# Patient Record
Sex: MALE | Race: WHITE | Employment: OTHER | URBAN - NONMETROPOLITAN AREA
[De-identification: names, ages, dates, MRNs, and addresses within clinical notes are randomized per-mention and may not be internally consistent; named-entity substitution may affect disease eponyms.]

---

## 2017-01-19 ENCOUNTER — TELEPHONE (OUTPATIENT)
Dept: UROLOGY | Age: 67
End: 2017-01-19

## 2017-01-26 ENCOUNTER — PROCEDURE VISIT (OUTPATIENT)
Dept: UROLOGY | Age: 67
End: 2017-01-26

## 2017-01-26 VITALS
DIASTOLIC BLOOD PRESSURE: 88 MMHG | HEIGHT: 69 IN | SYSTOLIC BLOOD PRESSURE: 126 MMHG | WEIGHT: 170 LBS | BODY MASS INDEX: 25.18 KG/M2

## 2017-01-26 DIAGNOSIS — R89.6 ABNORMAL CYTOLOGY: Primary | ICD-10-CM

## 2017-01-26 LAB
BILIRUBIN URINE: NEGATIVE
BLOOD URINE, POC: NORMAL
CHARACTER, URINE: CLEAR
COLOR, URINE: YELLOW
GLUCOSE URINE: NEGATIVE MG/DL
KETONES, URINE: NEGATIVE
LEUKOCYTE CLUMPS, URINE: NEGATIVE
NITRITE, URINE: NEGATIVE
PH, URINE: 5
PROTEIN, URINE: NEGATIVE MG/DL
SPECIFIC GRAVITY, URINE: >= 1.03 (ref 1–1.03)
UROBILINOGEN, URINE: 0.2 EU/DL

## 2017-01-26 PROCEDURE — 4004F PT TOBACCO SCREEN RCVD TLK: CPT | Performed by: UROLOGY

## 2017-01-26 PROCEDURE — 52000 CYSTOURETHROSCOPY: CPT | Performed by: UROLOGY

## 2017-01-26 PROCEDURE — 81003 URINALYSIS AUTO W/O SCOPE: CPT | Performed by: UROLOGY

## 2017-01-26 PROCEDURE — 99999 PR OFFICE/OUTPT VISIT,PROCEDURE ONLY: CPT | Performed by: UROLOGY

## 2017-01-26 ASSESSMENT — ENCOUNTER SYMPTOMS
BACK PAIN: 0
NAUSEA: 0
SHORTNESS OF BREATH: 0
ABDOMINAL PAIN: 0
FACIAL SWELLING: 0
EYE REDNESS: 0
CHEST TIGHTNESS: 0
COLOR CHANGE: 0
EYE PAIN: 0

## 2017-03-02 ENCOUNTER — TELEPHONE (OUTPATIENT)
Dept: UROLOGY | Age: 67
End: 2017-03-02

## 2017-03-02 DIAGNOSIS — C61 PROSTATE CANCER (HCC): Primary | ICD-10-CM

## 2017-03-09 LAB — PSA, ULTRASENSITIVE: 0.54 NG/ML

## 2017-06-08 ENCOUNTER — OFFICE VISIT (OUTPATIENT)
Dept: UROLOGY | Age: 67
End: 2017-06-08

## 2017-06-08 VITALS
HEIGHT: 69 IN | WEIGHT: 175 LBS | SYSTOLIC BLOOD PRESSURE: 118 MMHG | BODY MASS INDEX: 25.92 KG/M2 | DIASTOLIC BLOOD PRESSURE: 70 MMHG

## 2017-06-08 DIAGNOSIS — C61 CANCER OF PROSTATE (HCC): Primary | ICD-10-CM

## 2017-06-08 DIAGNOSIS — R82.89 URINE CYTOLOGY ABNORMAL: ICD-10-CM

## 2017-06-08 LAB
BILIRUBIN URINE: NEGATIVE
BLOOD URINE, POC: NEGATIVE
CHARACTER, URINE: CLEAR
COLOR, URINE: YELLOW
GLUCOSE URINE: NEGATIVE MG/DL
KETONES, URINE: NEGATIVE
LEUKOCYTE CLUMPS, URINE: NEGATIVE
NITRITE, URINE: NEGATIVE
PH, URINE: 5
PROTEIN, URINE: NEGATIVE MG/DL
SPECIFIC GRAVITY, URINE: 1.02 (ref 1–1.03)
UROBILINOGEN, URINE: 0.2 EU/DL

## 2017-06-08 PROCEDURE — 1123F ACP DISCUSS/DSCN MKR DOCD: CPT | Performed by: PHYSICIAN ASSISTANT

## 2017-06-08 PROCEDURE — 3017F COLORECTAL CA SCREEN DOC REV: CPT | Performed by: PHYSICIAN ASSISTANT

## 2017-06-08 PROCEDURE — G8420 CALC BMI NORM PARAMETERS: HCPCS | Performed by: PHYSICIAN ASSISTANT

## 2017-06-08 PROCEDURE — 4004F PT TOBACCO SCREEN RCVD TLK: CPT | Performed by: PHYSICIAN ASSISTANT

## 2017-06-08 PROCEDURE — 99213 OFFICE O/P EST LOW 20 MIN: CPT | Performed by: PHYSICIAN ASSISTANT

## 2017-06-08 PROCEDURE — 81003 URINALYSIS AUTO W/O SCOPE: CPT | Performed by: PHYSICIAN ASSISTANT

## 2017-06-08 PROCEDURE — G8427 DOCREV CUR MEDS BY ELIG CLIN: HCPCS | Performed by: PHYSICIAN ASSISTANT

## 2017-06-08 PROCEDURE — 4040F PNEUMOC VAC/ADMIN/RCVD: CPT | Performed by: PHYSICIAN ASSISTANT

## 2017-06-08 RX ORDER — HYDROCHLOROTHIAZIDE 25 MG/1
25 TABLET ORAL DAILY
COMMUNITY
End: 2018-01-25 | Stop reason: SINTOL

## 2017-06-09 LAB — CYTOLOGY-NON GYN: NORMAL

## 2017-06-12 LAB — PSA, ULTRASENSITIVE: 0.78 NG/ML

## 2017-06-14 ENCOUNTER — TELEPHONE (OUTPATIENT)
Dept: UROLOGY | Age: 67
End: 2017-06-14

## 2017-06-20 ENCOUNTER — NURSE ONLY (OUTPATIENT)
Dept: UROLOGY | Age: 67
End: 2017-06-20

## 2017-06-20 DIAGNOSIS — R31.9 HEMATURIA: Primary | ICD-10-CM

## 2017-06-20 DIAGNOSIS — C61 CANCER OF PROSTATE (HCC): ICD-10-CM

## 2017-06-20 PROCEDURE — 99999 PR OFFICE/OUTPT VISIT,PROCEDURE ONLY: CPT | Performed by: NURSE PRACTITIONER

## 2017-06-21 ENCOUNTER — OFFICE VISIT (OUTPATIENT)
Age: 67
End: 2017-06-21

## 2017-06-21 VITALS
OXYGEN SATURATION: 96 % | HEIGHT: 69 IN | TEMPERATURE: 98.4 F | WEIGHT: 169.2 LBS | SYSTOLIC BLOOD PRESSURE: 110 MMHG | BODY MASS INDEX: 25.06 KG/M2 | RESPIRATION RATE: 18 BRPM | DIASTOLIC BLOOD PRESSURE: 66 MMHG | HEART RATE: 106 BPM

## 2017-06-21 DIAGNOSIS — K40.90 RIGHT INGUINAL HERNIA: Primary | ICD-10-CM

## 2017-06-21 DIAGNOSIS — I10 ESSENTIAL HYPERTENSION: ICD-10-CM

## 2017-06-21 DIAGNOSIS — Z01.818 PRE-OP TESTING: ICD-10-CM

## 2017-06-21 PROCEDURE — 4004F PT TOBACCO SCREEN RCVD TLK: CPT | Performed by: SURGERY

## 2017-06-21 PROCEDURE — G8419 CALC BMI OUT NRM PARAM NOF/U: HCPCS | Performed by: SURGERY

## 2017-06-21 PROCEDURE — 4040F PNEUMOC VAC/ADMIN/RCVD: CPT | Performed by: SURGERY

## 2017-06-21 PROCEDURE — 99203 OFFICE O/P NEW LOW 30 MIN: CPT | Performed by: SURGERY

## 2017-06-21 PROCEDURE — G8427 DOCREV CUR MEDS BY ELIG CLIN: HCPCS | Performed by: SURGERY

## 2017-06-21 PROCEDURE — 3017F COLORECTAL CA SCREEN DOC REV: CPT | Performed by: SURGERY

## 2017-06-21 ASSESSMENT — ENCOUNTER SYMPTOMS
VOMITING: 0
EYE PAIN: 0
ABDOMINAL DISTENTION: 0
EYE REDNESS: 0
STRIDOR: 0
SHORTNESS OF BREATH: 0
PHOTOPHOBIA: 0
EYE DISCHARGE: 0
FACIAL SWELLING: 0
ABDOMINAL PAIN: 0
RECTAL PAIN: 0
CHEST TIGHTNESS: 0
WHEEZING: 0
BACK PAIN: 0
COLOR CHANGE: 0
RHINORRHEA: 0
CONSTIPATION: 1
TROUBLE SWALLOWING: 0
SINUS PRESSURE: 0
CHOKING: 0
APNEA: 0
COUGH: 0
BLOOD IN STOOL: 0
DIARRHEA: 0
ANAL BLEEDING: 0
SORE THROAT: 0
VOICE CHANGE: 0
NAUSEA: 0
EYE ITCHING: 0

## 2017-06-24 LAB
ANION GAP SERPL CALCULATED.3IONS-SCNC: 16 MMOL/L
BUN BLDV-MCNC: 24 MG/DL (ref 10–20)
CALCIUM SERPL-MCNC: 10.2 MG/DL (ref 8.7–10.8)
CHLORIDE BLD-SCNC: 104 MMOL/L (ref 95–111)
CO2: 25 MMOL/L (ref 21–32)
CREAT SERPL-MCNC: 1.2 MG/DL (ref 0.5–1.3)
EGFR AFRICAN AMERICAN: 73
EGFR IF NONAFRICAN AMERICAN: 60
GLUCOSE: 91 MG/DL (ref 70–100)
HCT VFR BLD CALC: 48.9 % (ref 41.4–51)
HEMOGLOBIN: 16.6 G/DL (ref 13.8–17)
POTASSIUM SERPL-SCNC: 3.8 MMOL/L (ref 3.5–5.4)
SODIUM BLD-SCNC: 141 MMOL/L (ref 134–147)

## 2017-07-21 ENCOUNTER — TELEPHONE (OUTPATIENT)
Dept: UROLOGY | Age: 67
End: 2017-07-21

## 2017-07-24 ENCOUNTER — ANESTHESIA EVENT (OUTPATIENT)
Dept: OPERATING ROOM | Age: 67
End: 2017-07-24
Payer: MEDICARE

## 2017-07-25 ENCOUNTER — HOSPITAL ENCOUNTER (OUTPATIENT)
Age: 67
Setting detail: OUTPATIENT SURGERY
Discharge: HOME | End: 2017-07-25
Attending: SURGERY | Admitting: SURGERY
Payer: MEDICARE

## 2017-07-25 ENCOUNTER — ANESTHESIA (OUTPATIENT)
Dept: OPERATING ROOM | Age: 67
End: 2017-07-25
Payer: MEDICARE

## 2017-07-25 VITALS
OXYGEN SATURATION: 93 % | DIASTOLIC BLOOD PRESSURE: 77 MMHG | TEMPERATURE: 97 F | HEIGHT: 68 IN | BODY MASS INDEX: 25.91 KG/M2 | HEART RATE: 95 BPM | SYSTOLIC BLOOD PRESSURE: 125 MMHG | RESPIRATION RATE: 16 BRPM | WEIGHT: 171 LBS

## 2017-07-25 VITALS
DIASTOLIC BLOOD PRESSURE: 97 MMHG | OXYGEN SATURATION: 95 % | RESPIRATION RATE: 1 BRPM | TEMPERATURE: 96.1 F | SYSTOLIC BLOOD PRESSURE: 163 MMHG

## 2017-07-25 LAB — POTASSIUM SERPL-SCNC: 4 MEQ/L (ref 3.5–5.2)

## 2017-07-25 PROCEDURE — 49650 LAP ING HERNIA REPAIR INIT: CPT | Performed by: SURGERY

## 2017-07-25 PROCEDURE — 3700000001 HC ADD 15 MINUTES (ANESTHESIA): Performed by: SURGERY

## 2017-07-25 PROCEDURE — 6370000000 HC RX 637 (ALT 250 FOR IP)

## 2017-07-25 PROCEDURE — 7100000001 HC PACU RECOVERY - ADDTL 15 MIN: Performed by: SURGERY

## 2017-07-25 PROCEDURE — 7100000010 HC PHASE II RECOVERY - FIRST 15 MIN: Performed by: SURGERY

## 2017-07-25 PROCEDURE — 3600000009 HC SURGERY ROBOT BASE: Performed by: SURGERY

## 2017-07-25 PROCEDURE — 2500000003 HC RX 250 WO HCPCS: Performed by: SURGERY

## 2017-07-25 PROCEDURE — 7100000000 HC PACU RECOVERY - FIRST 15 MIN: Performed by: SURGERY

## 2017-07-25 PROCEDURE — C1781 MESH (IMPLANTABLE): HCPCS | Performed by: SURGERY

## 2017-07-25 PROCEDURE — 84132 ASSAY OF SERUM POTASSIUM: CPT

## 2017-07-25 PROCEDURE — A6446 CONFORM BAND S W>=3" <5"/YD: HCPCS | Performed by: SURGERY

## 2017-07-25 PROCEDURE — 3600000019 HC SURGERY ROBOT ADDTL 15MIN: Performed by: SURGERY

## 2017-07-25 PROCEDURE — 3700000000 HC ANESTHESIA ATTENDED CARE: Performed by: SURGERY

## 2017-07-25 PROCEDURE — 36415 COLL VENOUS BLD VENIPUNCTURE: CPT

## 2017-07-25 PROCEDURE — 2580000003 HC RX 258: Performed by: ANESTHESIOLOGY

## 2017-07-25 PROCEDURE — 2500000003 HC RX 250 WO HCPCS: Performed by: ANESTHESIOLOGY

## 2017-07-25 PROCEDURE — 6360000002 HC RX W HCPCS: Performed by: ANESTHESIOLOGY

## 2017-07-25 PROCEDURE — 6360000002 HC RX W HCPCS: Performed by: SURGERY

## 2017-07-25 PROCEDURE — 2580000003 HC RX 258: Performed by: SURGERY

## 2017-07-25 PROCEDURE — 7100000011 HC PHASE II RECOVERY - ADDTL 15 MIN: Performed by: SURGERY

## 2017-07-25 PROCEDURE — S2900 ROBOTIC SURGICAL SYSTEM: HCPCS | Performed by: SURGERY

## 2017-07-25 DEVICE — MESH HERN L W10.8XL16CM R INGUINAL WHT POLYPR MFIL: Type: IMPLANTABLE DEVICE | Site: INGUINAL | Status: FUNCTIONAL

## 2017-07-25 DEVICE — MESH HERN L W10.8XL16CM L INGUINAL WHT POLYPR MFIL: Type: IMPLANTABLE DEVICE | Site: INGUINAL | Status: FUNCTIONAL

## 2017-07-25 RX ORDER — DEXAMETHASONE SODIUM PHOSPHATE 4 MG/ML
INJECTION, SOLUTION INTRA-ARTICULAR; INTRALESIONAL; INTRAMUSCULAR; INTRAVENOUS; SOFT TISSUE PRN
Status: DISCONTINUED | OUTPATIENT
Start: 2017-07-25 | End: 2017-07-25 | Stop reason: SDUPTHER

## 2017-07-25 RX ORDER — ONDANSETRON 2 MG/ML
INJECTION INTRAMUSCULAR; INTRAVENOUS PRN
Status: DISCONTINUED | OUTPATIENT
Start: 2017-07-25 | End: 2017-07-25 | Stop reason: SDUPTHER

## 2017-07-25 RX ORDER — DOCUSATE SODIUM 100 MG/1
100 CAPSULE, LIQUID FILLED ORAL 2 TIMES DAILY PRN
Qty: 30 CAPSULE | Refills: 1 | Status: SHIPPED | OUTPATIENT
Start: 2017-07-25 | End: 2018-01-25

## 2017-07-25 RX ORDER — GLYCOPYRROLATE 0.2 MG/ML
INJECTION INTRAMUSCULAR; INTRAVENOUS PRN
Status: DISCONTINUED | OUTPATIENT
Start: 2017-07-25 | End: 2017-07-25 | Stop reason: SDUPTHER

## 2017-07-25 RX ORDER — FENTANYL CITRATE 50 UG/ML
INJECTION, SOLUTION INTRAMUSCULAR; INTRAVENOUS PRN
Status: DISCONTINUED | OUTPATIENT
Start: 2017-07-25 | End: 2017-07-25 | Stop reason: SDUPTHER

## 2017-07-25 RX ORDER — SODIUM CHLORIDE, SODIUM LACTATE, POTASSIUM CHLORIDE, CALCIUM CHLORIDE 600; 310; 30; 20 MG/100ML; MG/100ML; MG/100ML; MG/100ML
INJECTION, SOLUTION INTRAVENOUS CONTINUOUS PRN
Status: DISCONTINUED | OUTPATIENT
Start: 2017-07-25 | End: 2017-07-25 | Stop reason: SDUPTHER

## 2017-07-25 RX ORDER — SODIUM CHLORIDE 9 MG/ML
INJECTION, SOLUTION INTRAVENOUS CONTINUOUS
Status: DISCONTINUED | OUTPATIENT
Start: 2017-07-25 | End: 2017-07-25 | Stop reason: HOSPADM

## 2017-07-25 RX ORDER — FENTANYL CITRATE 50 UG/ML
50 INJECTION, SOLUTION INTRAMUSCULAR; INTRAVENOUS EVERY 5 MIN PRN
Status: DISCONTINUED | OUTPATIENT
Start: 2017-07-25 | End: 2017-07-25 | Stop reason: HOSPADM

## 2017-07-25 RX ORDER — ACETAMINOPHEN 325 MG/1
650 TABLET ORAL EVERY 4 HOURS PRN
Status: DISCONTINUED | OUTPATIENT
Start: 2017-07-25 | End: 2017-07-25 | Stop reason: HOSPADM

## 2017-07-25 RX ORDER — DIPHENHYDRAMINE HYDROCHLORIDE 50 MG/ML
12.5 INJECTION INTRAMUSCULAR; INTRAVENOUS
Status: DISCONTINUED | OUTPATIENT
Start: 2017-07-25 | End: 2017-07-25 | Stop reason: HOSPADM

## 2017-07-25 RX ORDER — OXYCODONE HYDROCHLORIDE AND ACETAMINOPHEN 5; 325 MG/1; MG/1
1 TABLET ORAL EVERY 4 HOURS PRN
Status: DISCONTINUED | OUTPATIENT
Start: 2017-07-25 | End: 2017-07-25 | Stop reason: HOSPADM

## 2017-07-25 RX ORDER — ROCURONIUM BROMIDE 10 MG/ML
INJECTION, SOLUTION INTRAVENOUS PRN
Status: DISCONTINUED | OUTPATIENT
Start: 2017-07-25 | End: 2017-07-25 | Stop reason: SDUPTHER

## 2017-07-25 RX ORDER — LABETALOL HYDROCHLORIDE 5 MG/ML
5 INJECTION, SOLUTION INTRAVENOUS EVERY 10 MIN PRN
Status: DISCONTINUED | OUTPATIENT
Start: 2017-07-25 | End: 2017-07-25 | Stop reason: HOSPADM

## 2017-07-25 RX ORDER — SODIUM CHLORIDE 0.9 % (FLUSH) 0.9 %
10 SYRINGE (ML) INJECTION PRN
Status: DISCONTINUED | OUTPATIENT
Start: 2017-07-25 | End: 2017-07-25 | Stop reason: HOSPADM

## 2017-07-25 RX ORDER — SODIUM CHLORIDE 0.9 % (FLUSH) 0.9 %
10 SYRINGE (ML) INJECTION EVERY 12 HOURS SCHEDULED
Status: DISCONTINUED | OUTPATIENT
Start: 2017-07-25 | End: 2017-07-25 | Stop reason: HOSPADM

## 2017-07-25 RX ORDER — PROPOFOL 10 MG/ML
INJECTION, EMULSION INTRAVENOUS PRN
Status: DISCONTINUED | OUTPATIENT
Start: 2017-07-25 | End: 2017-07-25 | Stop reason: SDUPTHER

## 2017-07-25 RX ORDER — BUPIVACAINE HYDROCHLORIDE AND EPINEPHRINE 5; 5 MG/ML; UG/ML
INJECTION, SOLUTION EPIDURAL; INTRACAUDAL; PERINEURAL PRN
Status: DISCONTINUED | OUTPATIENT
Start: 2017-07-25 | End: 2017-07-25 | Stop reason: HOSPADM

## 2017-07-25 RX ORDER — OXYCODONE HYDROCHLORIDE AND ACETAMINOPHEN 5; 325 MG/1; MG/1
2 TABLET ORAL EVERY 4 HOURS PRN
Status: DISCONTINUED | OUTPATIENT
Start: 2017-07-25 | End: 2017-07-25 | Stop reason: HOSPADM

## 2017-07-25 RX ORDER — ONDANSETRON 2 MG/ML
4 INJECTION INTRAMUSCULAR; INTRAVENOUS EVERY 6 HOURS PRN
Status: DISCONTINUED | OUTPATIENT
Start: 2017-07-25 | End: 2017-07-25 | Stop reason: HOSPADM

## 2017-07-25 RX ORDER — PROMETHAZINE HYDROCHLORIDE 25 MG/ML
12.5 INJECTION, SOLUTION INTRAMUSCULAR; INTRAVENOUS
Status: DISCONTINUED | OUTPATIENT
Start: 2017-07-25 | End: 2017-07-25 | Stop reason: HOSPADM

## 2017-07-25 RX ORDER — OXYCODONE HYDROCHLORIDE AND ACETAMINOPHEN 5; 325 MG/1; MG/1
TABLET ORAL
Status: COMPLETED
Start: 2017-07-25 | End: 2017-07-25

## 2017-07-25 RX ORDER — FENTANYL CITRATE 50 UG/ML
25 INJECTION, SOLUTION INTRAMUSCULAR; INTRAVENOUS EVERY 5 MIN PRN
Status: DISCONTINUED | OUTPATIENT
Start: 2017-07-25 | End: 2017-07-25 | Stop reason: HOSPADM

## 2017-07-25 RX ORDER — MEPERIDINE HYDROCHLORIDE 25 MG/ML
25 INJECTION INTRAMUSCULAR; INTRAVENOUS; SUBCUTANEOUS
Status: DISCONTINUED | OUTPATIENT
Start: 2017-07-25 | End: 2017-07-25 | Stop reason: HOSPADM

## 2017-07-25 RX ORDER — OXYCODONE HYDROCHLORIDE AND ACETAMINOPHEN 5; 325 MG/1; MG/1
1-2 TABLET ORAL EVERY 6 HOURS PRN
Qty: 30 TABLET | Refills: 0 | Status: SHIPPED | OUTPATIENT
Start: 2017-07-25 | End: 2017-08-01

## 2017-07-25 RX ORDER — EPHEDRINE SULFATE 50 MG/ML
INJECTION INTRAVENOUS PRN
Status: DISCONTINUED | OUTPATIENT
Start: 2017-07-25 | End: 2017-07-25 | Stop reason: SDUPTHER

## 2017-07-25 RX ORDER — MIDAZOLAM HYDROCHLORIDE 1 MG/ML
INJECTION INTRAMUSCULAR; INTRAVENOUS PRN
Status: DISCONTINUED | OUTPATIENT
Start: 2017-07-25 | End: 2017-07-25 | Stop reason: SDUPTHER

## 2017-07-25 RX ORDER — NEOSTIGMINE METHYLSULFATE 1 MG/ML
INJECTION, SOLUTION INTRAVENOUS PRN
Status: DISCONTINUED | OUTPATIENT
Start: 2017-07-25 | End: 2017-07-25 | Stop reason: SDUPTHER

## 2017-07-25 RX ADMIN — ROCURONIUM BROMIDE 10 MG: 10 INJECTION INTRAVENOUS at 09:00

## 2017-07-25 RX ADMIN — CEFAZOLIN SODIUM 1 G: 1 INJECTION, SOLUTION INTRAVENOUS at 07:50

## 2017-07-25 RX ADMIN — DEXAMETHASONE SODIUM PHOSPHATE 8 MG: 4 INJECTION, SOLUTION INTRAMUSCULAR; INTRAVENOUS at 08:00

## 2017-07-25 RX ADMIN — FENTANYL CITRATE 50 MCG: 50 INJECTION, SOLUTION INTRAMUSCULAR; INTRAVENOUS at 08:41

## 2017-07-25 RX ADMIN — PHENYLEPHRINE HYDROCHLORIDE 100 MCG: 10 INJECTION INTRAVENOUS at 07:52

## 2017-07-25 RX ADMIN — EPHEDRINE SULFATE 10 MG: 50 INJECTION, SOLUTION INTRAVENOUS at 07:56

## 2017-07-25 RX ADMIN — FENTANYL CITRATE 50 MCG: 50 INJECTION, SOLUTION INTRAMUSCULAR; INTRAVENOUS at 08:04

## 2017-07-25 RX ADMIN — LIDOCAINE HYDROCHLORIDE 60 MG: 20 INJECTION, SOLUTION INTRAVENOUS at 07:42

## 2017-07-25 RX ADMIN — SODIUM CHLORIDE: 9 INJECTION, SOLUTION INTRAVENOUS at 06:50

## 2017-07-25 RX ADMIN — OXYCODONE HYDROCHLORIDE AND ACETAMINOPHEN 1 TABLET: 5; 325 TABLET ORAL at 11:28

## 2017-07-25 RX ADMIN — ROCURONIUM BROMIDE 40 MG: 10 INJECTION INTRAVENOUS at 07:44

## 2017-07-25 RX ADMIN — ONDANSETRON 4 MG: 2 INJECTION INTRAMUSCULAR; INTRAVENOUS at 08:00

## 2017-07-25 RX ADMIN — ROCURONIUM BROMIDE 10 MG: 10 INJECTION INTRAVENOUS at 08:21

## 2017-07-25 RX ADMIN — PHENYLEPHRINE HYDROCHLORIDE 100 MCG: 10 INJECTION INTRAVENOUS at 07:55

## 2017-07-25 RX ADMIN — FENTANYL CITRATE 50 MCG: 50 INJECTION, SOLUTION INTRAMUSCULAR; INTRAVENOUS at 07:58

## 2017-07-25 RX ADMIN — MIDAZOLAM 2 MG: 1 INJECTION INTRAMUSCULAR; INTRAVENOUS at 07:40

## 2017-07-25 RX ADMIN — GLYCOPYRROLATE 0.6 MG: 0.2 INJECTION, SOLUTION INTRAMUSCULAR; INTRAVENOUS at 09:33

## 2017-07-25 RX ADMIN — NEOSTIGMINE METHYLSULFATE 3 MG: 1 INJECTION INTRAVENOUS at 09:33

## 2017-07-25 RX ADMIN — FENTANYL CITRATE 25 MCG: 50 INJECTION INTRAMUSCULAR; INTRAVENOUS at 10:00

## 2017-07-25 RX ADMIN — FENTANYL CITRATE 50 MCG: 50 INJECTION, SOLUTION INTRAMUSCULAR; INTRAVENOUS at 07:41

## 2017-07-25 RX ADMIN — PROPOFOL 150 MG: 10 INJECTION, EMULSION INTRAVENOUS at 07:42

## 2017-07-25 RX ADMIN — SODIUM CHLORIDE, POTASSIUM CHLORIDE, SODIUM LACTATE AND CALCIUM CHLORIDE: 600; 310; 30; 20 INJECTION, SOLUTION INTRAVENOUS at 08:35

## 2017-07-25 RX ADMIN — FENTANYL CITRATE 50 MCG: 50 INJECTION, SOLUTION INTRAMUSCULAR; INTRAVENOUS at 09:45

## 2017-07-25 ASSESSMENT — PULMONARY FUNCTION TESTS
PIF_VALUE: 0
PIF_VALUE: 17
PIF_VALUE: 21
PIF_VALUE: 24
PIF_VALUE: 10
PIF_VALUE: 13
PIF_VALUE: 24
PIF_VALUE: 20
PIF_VALUE: 15
PIF_VALUE: 25
PIF_VALUE: 25
PIF_VALUE: 13
PIF_VALUE: 22
PIF_VALUE: 24
PIF_VALUE: 11
PIF_VALUE: 3
PIF_VALUE: 20
PIF_VALUE: 1
PIF_VALUE: 10
PIF_VALUE: 24
PIF_VALUE: 23
PIF_VALUE: 21
PIF_VALUE: 0
PIF_VALUE: 23
PIF_VALUE: 11
PIF_VALUE: 24
PIF_VALUE: 13
PIF_VALUE: 10
PIF_VALUE: 10
PIF_VALUE: 25
PIF_VALUE: 26
PIF_VALUE: 20
PIF_VALUE: 19
PIF_VALUE: 4
PIF_VALUE: 21
PIF_VALUE: 24
PIF_VALUE: 18
PIF_VALUE: 10
PIF_VALUE: 22
PIF_VALUE: 17
PIF_VALUE: 19
PIF_VALUE: 10
PIF_VALUE: 25
PIF_VALUE: 14
PIF_VALUE: 24
PIF_VALUE: 1
PIF_VALUE: 20
PIF_VALUE: 26
PIF_VALUE: 19
PIF_VALUE: 13
PIF_VALUE: 25
PIF_VALUE: 14
PIF_VALUE: 24
PIF_VALUE: 22
PIF_VALUE: 13
PIF_VALUE: 24
PIF_VALUE: 25
PIF_VALUE: 21
PIF_VALUE: 1
PIF_VALUE: 23
PIF_VALUE: 10
PIF_VALUE: 0
PIF_VALUE: 24
PIF_VALUE: 22
PIF_VALUE: 22
PIF_VALUE: 21
PIF_VALUE: 23
PIF_VALUE: 27
PIF_VALUE: 24
PIF_VALUE: 1
PIF_VALUE: 10
PIF_VALUE: 11
PIF_VALUE: 13
PIF_VALUE: 24
PIF_VALUE: 25
PIF_VALUE: 24
PIF_VALUE: 14
PIF_VALUE: 14
PIF_VALUE: 20
PIF_VALUE: 18
PIF_VALUE: 10
PIF_VALUE: 14
PIF_VALUE: 24
PIF_VALUE: 19
PIF_VALUE: 10
PIF_VALUE: 19
PIF_VALUE: 24
PIF_VALUE: 16
PIF_VALUE: 14
PIF_VALUE: 15
PIF_VALUE: 25
PIF_VALUE: 1
PIF_VALUE: 25
PIF_VALUE: 24
PIF_VALUE: 23
PIF_VALUE: 13
PIF_VALUE: 10
PIF_VALUE: 21
PIF_VALUE: 19
PIF_VALUE: 24
PIF_VALUE: 23
PIF_VALUE: 24
PIF_VALUE: 2
PIF_VALUE: 0
PIF_VALUE: 25
PIF_VALUE: 23
PIF_VALUE: 24
PIF_VALUE: 24
PIF_VALUE: 0
PIF_VALUE: 18
PIF_VALUE: 18
PIF_VALUE: 0
PIF_VALUE: 23
PIF_VALUE: 25
PIF_VALUE: 14
PIF_VALUE: 19
PIF_VALUE: 24
PIF_VALUE: 17
PIF_VALUE: 17
PIF_VALUE: 23
PIF_VALUE: 24
PIF_VALUE: 9
PIF_VALUE: 25
PIF_VALUE: 25
PIF_VALUE: 19
PIF_VALUE: 13
PIF_VALUE: 17
PIF_VALUE: 19
PIF_VALUE: 11
PIF_VALUE: 0
PIF_VALUE: 25

## 2017-07-25 ASSESSMENT — PAIN DESCRIPTION - LOCATION
LOCATION: ABDOMEN
LOCATION: ABDOMEN

## 2017-07-25 ASSESSMENT — PAIN SCALES - GENERAL
PAINLEVEL_OUTOF10: 3
PAINLEVEL_OUTOF10: 3
PAINLEVEL_OUTOF10: 1
PAINLEVEL_OUTOF10: 3
PAINLEVEL_OUTOF10: 2
PAINLEVEL_OUTOF10: 3
PAINLEVEL_OUTOF10: 4
PAINLEVEL_OUTOF10: 3

## 2017-07-25 ASSESSMENT — PAIN SCALES - WONG BAKER: WONGBAKER_NUMERICALRESPONSE: 0

## 2017-07-25 ASSESSMENT — PAIN - FUNCTIONAL ASSESSMENT: PAIN_FUNCTIONAL_ASSESSMENT: 0-10

## 2017-07-25 ASSESSMENT — PAIN DESCRIPTION - PAIN TYPE
TYPE: SURGICAL PAIN
TYPE: SURGICAL PAIN

## 2017-07-25 ASSESSMENT — PAIN DESCRIPTION - PROGRESSION: CLINICAL_PROGRESSION: NOT CHANGED

## 2017-07-25 ASSESSMENT — PAIN DESCRIPTION - DESCRIPTORS: DESCRIPTORS: ACHING

## 2017-07-25 ASSESSMENT — PAIN DESCRIPTION - ORIENTATION: ORIENTATION: RIGHT;LEFT

## 2017-07-26 ENCOUNTER — TELEPHONE (OUTPATIENT)
Dept: SURGERY | Age: 67
End: 2017-07-26

## 2017-08-09 ENCOUNTER — OFFICE VISIT (OUTPATIENT)
Dept: SURGERY | Age: 67
End: 2017-08-09

## 2017-08-09 VITALS
DIASTOLIC BLOOD PRESSURE: 78 MMHG | RESPIRATION RATE: 16 BRPM | WEIGHT: 167 LBS | TEMPERATURE: 96.9 F | BODY MASS INDEX: 25.39 KG/M2 | HEART RATE: 90 BPM | SYSTOLIC BLOOD PRESSURE: 108 MMHG

## 2017-08-09 DIAGNOSIS — Z87.19 S/P BILATERAL INGUINAL HERNIA REPAIR: Primary | ICD-10-CM

## 2017-08-09 DIAGNOSIS — Z98.890 S/P BILATERAL INGUINAL HERNIA REPAIR: Primary | ICD-10-CM

## 2017-08-09 PROCEDURE — 99024 POSTOP FOLLOW-UP VISIT: CPT | Performed by: NURSE PRACTITIONER

## 2017-08-09 ASSESSMENT — ENCOUNTER SYMPTOMS
PHOTOPHOBIA: 0
SINUS PRESSURE: 0
CHEST TIGHTNESS: 0
CONSTIPATION: 0
ABDOMINAL PAIN: 0
WHEEZING: 0
STRIDOR: 0
EYE REDNESS: 0
COUGH: 0
BLOOD IN STOOL: 0
ABDOMINAL DISTENTION: 0
EYE PAIN: 0
SHORTNESS OF BREATH: 0
APNEA: 0
ANAL BLEEDING: 0
COLOR CHANGE: 0
EYE ITCHING: 0
DIARRHEA: 0
VOICE CHANGE: 0
NAUSEA: 0
SORE THROAT: 0
EYE DISCHARGE: 0
FACIAL SWELLING: 0
CHOKING: 0
RECTAL PAIN: 0
RHINORRHEA: 0
TROUBLE SWALLOWING: 0
VOMITING: 0
BACK PAIN: 0

## 2017-08-22 ENCOUNTER — TELEPHONE (OUTPATIENT)
Dept: UROLOGY | Age: 67
End: 2017-08-22

## 2017-09-01 ENCOUNTER — HOSPITAL ENCOUNTER (OUTPATIENT)
Dept: RADIATION ONCOLOGY | Age: 67
Discharge: HOME | End: 2017-09-01
Payer: MEDICARE

## 2017-09-01 PROCEDURE — 99211 OFF/OP EST MAY X REQ PHY/QHP: CPT | Performed by: RADIOLOGY

## 2017-09-09 LAB — PROSTATE SPECIFIC ANTIGEN: 1.18 NG/ML

## 2017-09-12 ENCOUNTER — TELEPHONE (OUTPATIENT)
Dept: UROLOGY | Age: 67
End: 2017-09-12

## 2017-09-14 ENCOUNTER — OFFICE VISIT (OUTPATIENT)
Dept: UROLOGY | Age: 67
End: 2017-09-14
Payer: MEDICARE

## 2017-09-14 VITALS
WEIGHT: 171 LBS | DIASTOLIC BLOOD PRESSURE: 77 MMHG | HEIGHT: 69 IN | BODY MASS INDEX: 25.33 KG/M2 | SYSTOLIC BLOOD PRESSURE: 108 MMHG

## 2017-09-14 DIAGNOSIS — C61 CANCER OF PROSTATE (HCC): Primary | ICD-10-CM

## 2017-09-14 DIAGNOSIS — R31.29 MICROSCOPIC HEMATURIA: Primary | ICD-10-CM

## 2017-09-14 LAB
BILIRUBIN URINE: NORMAL
BLOOD URINE, POC: NORMAL
CHARACTER, URINE: CLEAR
COLOR, URINE: YELLOW
GLUCOSE URINE: NEGATIVE MG/DL
KETONES, URINE: NEGATIVE
LEUKOCYTE CLUMPS, URINE: NEGATIVE
NITRITE, URINE: NEGATIVE
PH, URINE: 5
PROTEIN, URINE: NORMAL MG/DL
SPECIFIC GRAVITY, URINE: >= 1.03 (ref 1–1.03)
UROBILINOGEN, URINE: 0.2 EU/DL

## 2017-09-14 PROCEDURE — 3017F COLORECTAL CA SCREEN DOC REV: CPT | Performed by: PHYSICIAN ASSISTANT

## 2017-09-14 PROCEDURE — 4004F PT TOBACCO SCREEN RCVD TLK: CPT | Performed by: PHYSICIAN ASSISTANT

## 2017-09-14 PROCEDURE — 1123F ACP DISCUSS/DSCN MKR DOCD: CPT | Performed by: PHYSICIAN ASSISTANT

## 2017-09-14 PROCEDURE — 4040F PNEUMOC VAC/ADMIN/RCVD: CPT | Performed by: PHYSICIAN ASSISTANT

## 2017-09-14 PROCEDURE — 99213 OFFICE O/P EST LOW 20 MIN: CPT | Performed by: PHYSICIAN ASSISTANT

## 2017-09-14 PROCEDURE — 81003 URINALYSIS AUTO W/O SCOPE: CPT | Performed by: PHYSICIAN ASSISTANT

## 2017-09-14 PROCEDURE — G8417 CALC BMI ABV UP PARAM F/U: HCPCS | Performed by: PHYSICIAN ASSISTANT

## 2017-09-14 PROCEDURE — G8428 CUR MEDS NOT DOCUMENT: HCPCS | Performed by: PHYSICIAN ASSISTANT

## 2017-09-22 ENCOUNTER — TELEPHONE (OUTPATIENT)
Dept: UROLOGY | Age: 67
End: 2017-09-22

## 2017-10-09 ENCOUNTER — TELEPHONE (OUTPATIENT)
Dept: UROLOGY | Age: 67
End: 2017-10-09

## 2017-10-10 NOTE — TELEPHONE ENCOUNTER
Spoke with Patient, He has not had his PSA drawn yet, States he has gout and has had some difficulty getting around. We R/S Appt for 2 weeks.

## 2017-10-19 LAB — PSA, ULTRASENSITIVE: 1.34 NG/ML

## 2017-10-24 ENCOUNTER — TELEPHONE (OUTPATIENT)
Dept: UROLOGY | Age: 67
End: 2017-10-24

## 2017-10-24 NOTE — TELEPHONE ENCOUNTER
Patient's PSA level continues to rise. Will see if insurance will cover Tae Rice prior to his next visit on Thursday, October 26.

## 2017-10-26 ENCOUNTER — OFFICE VISIT (OUTPATIENT)
Dept: UROLOGY | Age: 67
End: 2017-10-26
Payer: MEDICARE

## 2017-10-26 VITALS
HEIGHT: 69 IN | DIASTOLIC BLOOD PRESSURE: 88 MMHG | SYSTOLIC BLOOD PRESSURE: 130 MMHG | BODY MASS INDEX: 25.42 KG/M2 | WEIGHT: 171.6 LBS

## 2017-10-26 DIAGNOSIS — C61 CANCER OF PROSTATE (HCC): Primary | ICD-10-CM

## 2017-10-26 DIAGNOSIS — R31.29 MICROSCOPIC HEMATURIA: ICD-10-CM

## 2017-10-26 DIAGNOSIS — M81.8 OTHER OSTEOPOROSIS WITHOUT CURRENT PATHOLOGICAL FRACTURE: ICD-10-CM

## 2017-10-26 DIAGNOSIS — Z79.818 ANDROGEN DEPRIVATION THERAPY: ICD-10-CM

## 2017-10-26 LAB
BILIRUBIN URINE: NEGATIVE
BLOOD URINE, POC: NORMAL
CHARACTER, URINE: CLEAR
COLOR, URINE: YELLOW
GLUCOSE URINE: NEGATIVE MG/DL
KETONES, URINE: NEGATIVE
LEUKOCYTE CLUMPS, URINE: NEGATIVE
NITRITE, URINE: NEGATIVE
PH, URINE: 7.5
PROTEIN, URINE: NORMAL MG/DL
SPECIFIC GRAVITY, URINE: 1.02 (ref 1–1.03)
UROBILINOGEN, URINE: 0.2 EU/DL

## 2017-10-26 PROCEDURE — 99213 OFFICE O/P EST LOW 20 MIN: CPT | Performed by: PHYSICIAN ASSISTANT

## 2017-10-26 PROCEDURE — 4005F PHARM THX FOR OP RXD: CPT | Performed by: PHYSICIAN ASSISTANT

## 2017-10-26 PROCEDURE — G8484 FLU IMMUNIZE NO ADMIN: HCPCS | Performed by: PHYSICIAN ASSISTANT

## 2017-10-26 PROCEDURE — 4004F PT TOBACCO SCREEN RCVD TLK: CPT | Performed by: PHYSICIAN ASSISTANT

## 2017-10-26 PROCEDURE — 4040F PNEUMOC VAC/ADMIN/RCVD: CPT | Performed by: PHYSICIAN ASSISTANT

## 2017-10-26 PROCEDURE — G8427 DOCREV CUR MEDS BY ELIG CLIN: HCPCS | Performed by: PHYSICIAN ASSISTANT

## 2017-10-26 PROCEDURE — 1123F ACP DISCUSS/DSCN MKR DOCD: CPT | Performed by: PHYSICIAN ASSISTANT

## 2017-10-26 PROCEDURE — 81003 URINALYSIS AUTO W/O SCOPE: CPT | Performed by: PHYSICIAN ASSISTANT

## 2017-10-26 PROCEDURE — 3017F COLORECTAL CA SCREEN DOC REV: CPT | Performed by: PHYSICIAN ASSISTANT

## 2017-10-26 PROCEDURE — G8417 CALC BMI ABV UP PARAM F/U: HCPCS | Performed by: PHYSICIAN ASSISTANT

## 2017-10-26 NOTE — PROGRESS NOTES
slight DANNY. S1 and S2 normal.  Respiratory: Effort normal. CTA bilaterally without rales, rhonchi, wheezes. Abdomen: Soft, Non-tender. Active BS. Skin: No rashes or obvious lesions. Nursing note and vitals reviewed    Labs    Results for POC orders placed in visit on 10/26/17   POCT Urinalysis No Micro (Auto)   Result Value Ref Range    Glucose, Ur Negative NEGATIVE mg/dl    Bilirubin Urine Negative     Ketones, Urine Negative NEGATIVE    Specific Gravity, Urine 1.020 1.002 - 1.03    Blood, UA POC Moderate NEGATIVE    pH, Urine 7.50 5.0 - 9.0    Protein, Urine Trace NEGATIVE mg/dl    Urobilinogen, Urine 0.20 0.0 - 1.0 eu/dl    Nitrite, Urine Negative NEGATIVE    Leukocyte Clumps, Urine Negative NEGATIVE    Color, Urine Yellow YELLOW-STR    Character, Urine Clear [id number]       Lab Results   Component Value Date    CREATININE 1.2 06/23/2017    BUN 24 (H) 06/23/2017     06/23/2017    K 4.0 07/25/2017     06/23/2017    CO2 25 06/23/2017       Laboratory:     PSA 1.34 10/18/2017    PSA 1.18 09/09/2017     PSA 0.78 06/12/17     PSA 0.54 03/17     PSA 0.36 11/28/2016     PSA 0.08 05/06/2014         Plan:    1. Prostate cancer- Status post RALP with bilateral lymph node dissection in 6/12. His pathology was significant for [place] 4+5=9 disease with negative margins and negative nodes. Status post 6600cGy external beam radiation in August 2016. His first post radiation PSA decreased to 0.36 but has steadily risen over the last several weeks to his current value of 1.34. We discussed androgen deprivation therapy at length today. We reviewed the potential side effects of the medication. He decided that he would like to initiate [place] today. Will give Firmagon 240 mg SC injection x 1 today. At months two and three will proceed with Firmagon 80 mg SC injections. Will check PSA and testosterone monthly. After third injection will obtain a DEXA scan to see if Prolia needed.  Will then plan to switch to Lupron 45 mg IM every 6 months. Mr. Meliza Stack will return in January 2018 for status check.      2. Persistent Microscopic Hematuria/History of Atypical Cells on urine cytology- Persistent. Status post negative office cystoscopy in January 2017. CT scan of abdomen and pelvis in April 2017 revealed nonobstructive nephrolithiasis of the inferior pole calyces of the right kidney. No evidence of malignancy. Urine sent for Bladder Cx but results inconclusive. Will send second sample today. Will call patient with the results.     3. Inguinal Hernias- Resolved. Status post Robotic repair of right indirect and direct inguinal hernia and left direct inguinal hernia with mesh on July 25, 2017.

## 2017-10-26 NOTE — PROGRESS NOTES
Following Alexa Murray NP plan of care. FIRMAGON 120 MG GIVEN RIGHT ABDOMEN  MG GIVEN LEFT ABDOMEN. Lot Number: [id number]  Expiration Date: 10/2019  Valent[facility] Cheung #: [id number]     After Injection was given there were no reactions at injection site and patient was feeling well. Patient was notified that possible side effects from injections include: Redness, swelling and itching at the injection site. Possible side effects of androgen deprivation therapy, including hot flashes, flushing of the skin, increased weight, decreased sex drive, and difficulties with ED. Patient was instructed to call the office with any further questions or concerns. Patient supplied their own medications No    [address] therapy first initiated on 10/26/2017.

## 2017-11-02 ENCOUNTER — TELEPHONE (OUTPATIENT)
Dept: UROLOGY | Age: 67
End: 2017-11-02

## 2017-11-18 LAB
PSA, ULTRASENSITIVE: 0.13 NG/ML
TESTOSTERONE TOTAL: <20 NG/DL

## 2017-11-27 ENCOUNTER — NURSE ONLY (OUTPATIENT)
Dept: UROLOGY | Age: 67
End: 2017-11-27
Payer: MEDICARE

## 2017-11-27 DIAGNOSIS — C61 CANCER OF PROSTATE (HCC): Primary | ICD-10-CM

## 2017-11-27 PROCEDURE — 96402 CHEMO HORMON ANTINEOPL SQ/IM: CPT | Performed by: NURSE PRACTITIONER

## 2017-11-27 NOTE — PROGRESS NOTES
Following Emma Paz [facility] plan of care. FIRMAGON 80 MG GIVEN S.C Left ABDOMEN. Lot Number: [id number]  Expiration Date: 10/2019  Valentín Cheung #: [id number]    After Injection was given there were no reactions at injection site and patient was feeling well. Patient was notified that possible side effects from injections include: Redness, swelling and itching at the injection site. Possible side effects of androgen deprivation therapy, including hot flashes, flushing of the skin, increased weight, decreased sex drive, and difficulties with ED. Patient was instructed to call the office with any further questions or concerns. Patient supplied their own medications No    Pt [address] therapy first initiated on 10/26/17.

## 2017-12-11 LAB — PROSTATE SPECIFIC ANTIGEN: 0.07 NG/ML

## 2017-12-28 ENCOUNTER — NURSE ONLY (OUTPATIENT)
Dept: UROLOGY | Age: 67
End: 2017-12-28
Payer: MEDICARE

## 2017-12-28 DIAGNOSIS — C61 CANCER OF PROSTATE (HCC): Primary | ICD-10-CM

## 2017-12-28 PROCEDURE — 96402 CHEMO HORMON ANTINEOPL SQ/IM: CPT | Performed by: UROLOGY

## 2018-01-11 ENCOUNTER — TELEPHONE (OUTPATIENT)
Dept: UROLOGY | Age: 68
End: 2018-01-11

## 2018-01-11 LAB — PSA, ULTRASENSITIVE: 0.01 NG/ML

## 2018-01-11 NOTE — TELEPHONE ENCOUNTER
Called Esteban Gonzales and advised that his PSA has decreased 0.01, which is exactly where it should be on his firmagon injections.  Verified his bone scann appoinment and his F/u Appt with Surjit Lu

## 2018-01-17 ENCOUNTER — HOSPITAL ENCOUNTER (OUTPATIENT)
Dept: WOMENS IMAGING | Age: 68
Discharge: HOME | End: 2018-01-17
Payer: MEDICARE

## 2018-01-17 DIAGNOSIS — M81.8 OTHER OSTEOPOROSIS WITHOUT CURRENT PATHOLOGICAL FRACTURE: ICD-10-CM

## 2018-01-17 PROCEDURE — 77080 DXA BONE DENSITY AXIAL: CPT

## 2018-01-22 ENCOUNTER — TELEPHONE (OUTPATIENT)
Dept: UROLOGY | Age: 68
End: 2018-01-22

## 2018-01-25 ENCOUNTER — OFFICE VISIT (OUTPATIENT)
Dept: UROLOGY | Age: 68
End: 2018-01-25
Payer: MEDICARE

## 2018-01-25 VITALS
DIASTOLIC BLOOD PRESSURE: 70 MMHG | HEIGHT: 69 IN | WEIGHT: 167 LBS | SYSTOLIC BLOOD PRESSURE: 128 MMHG | BODY MASS INDEX: 24.73 KG/M2

## 2018-01-25 DIAGNOSIS — M81.8 IDIOPATHIC OSTEOPOROSIS: ICD-10-CM

## 2018-01-25 DIAGNOSIS — N52.9 ERECTILE DYSFUNCTION, UNSPECIFIED ERECTILE DYSFUNCTION TYPE: ICD-10-CM

## 2018-01-25 DIAGNOSIS — C61 CANCER OF PROSTATE (HCC): Primary | ICD-10-CM

## 2018-01-25 LAB
BILIRUBIN URINE: NORMAL
BLOOD URINE, POC: NEGATIVE
CHARACTER, URINE: CLEAR
COLOR, URINE: YELLOW
GLUCOSE URINE: NEGATIVE MG/DL
KETONES, URINE: NORMAL
LEUKOCYTE CLUMPS, URINE: NEGATIVE
NITRITE, URINE: NEGATIVE
PH, URINE: 5
PROTEIN, URINE: NORMAL MG/DL
SPECIFIC GRAVITY, URINE: >= 1.03 (ref 1–1.03)
UROBILINOGEN, URINE: 0.2 EU/DL

## 2018-01-25 PROCEDURE — 99999 PR OFFICE/OUTPT VISIT,PROCEDURE ONLY: CPT | Performed by: PHYSICIAN ASSISTANT

## 2018-01-25 PROCEDURE — 96402 CHEMO HORMON ANTINEOPL SQ/IM: CPT | Performed by: PHYSICIAN ASSISTANT

## 2018-01-25 PROCEDURE — 99213 OFFICE O/P EST LOW 20 MIN: CPT | Performed by: PHYSICIAN ASSISTANT

## 2018-01-25 PROCEDURE — 81003 URINALYSIS AUTO W/O SCOPE: CPT | Performed by: PHYSICIAN ASSISTANT

## 2018-01-25 PROCEDURE — 96401 CHEMO ANTI-NEOPL SQ/IM: CPT | Performed by: PHYSICIAN ASSISTANT

## 2018-01-25 RX ORDER — ACETAMINOPHEN 160 MG
TABLET,DISINTEGRATING ORAL DAILY
COMMUNITY
End: 2021-02-18

## 2018-01-25 RX ORDER — LISINOPRIL 5 MG/1
TABLET ORAL DAILY
COMMUNITY
End: 2019-08-15

## 2018-01-25 NOTE — PROGRESS NOTES
Following Mazariegos  plan of care. LUPRON 45 MG GIVEN I.M Right UOQ HIP  Lot Number: [id number]  Expiration Date: 6-3-2019  [facility] [id number]    After Injection was given there were no reactions at injection site and patient was feeling well. Patient was notified that possible side effects from injections include: Redness, swelling and itching at the injection site. Possible side effects of androgen deprivation therapy, including hot flashes, flushing of the skin, increased weight, decreased sex drive, and difficulties with ED. Patient was instructed to call the office with any further questions or concerns. Patient supplied their own medications No      Pt [address] therapy first initiated on Firmagon 10-26-17. Lupron 45mg 1-25-18  Prolia 60mg 1-25-18      Following Mazariegos  plan of care. PROLIA 60MG GIVEN Right S.C ABD  Lot Number: [id number]  Expiration Date: 4-2020  [facility] #: [id number]    After Injection was given there were no reactions at injection site and patient was feeling well. Patient was notified that possible side effects from injections include: Redness, swelling and itching at the injection site. Possible side effects of androgen deprivation therapy, including hot flashes, flushing of the skin, increased weight, decreased sex drive, and difficulties with ED. Patient was instructed to inform their dental office that they are receiving Prolia and that major dental procedures should be avoided. Patient was advised to call our office with any further questions or concerns. Patient supplied their own medications No      Pt [address] therapy first initiated on Prolia.  1-25-18

## 2018-01-28 NOTE — PROGRESS NOTES
I have reviewed the patients chart and Stanley Harris has discussed relevant portions with me. I agree with the assessment and plan.

## 2018-03-11 LAB — PSA, ULTRASENSITIVE: <0.014 NG/ML

## 2018-03-14 ENCOUNTER — TELEPHONE (OUTPATIENT)
Dept: UROLOGY | Age: 68
End: 2018-03-14

## 2018-05-09 LAB — PSA, ULTRASENSITIVE: <0.014 NG/ML

## 2018-07-24 LAB — PSA, ULTRASENSITIVE: <0.014 NG/ML

## 2018-07-25 NOTE — PROGRESS NOTES
Mr. David Gurrola a 72 year old male with a history of prostate cancer. He is status post RALP with bilateral lymph node dissection in 6/12. His pathology was significant for [place] 4+5=9 disease with negative margins and negative nodes. He underwent 6600 cGy external beam radiation therapy in August 2016. Unfortunately, his PSA level has started to rise over and he was started on Firmagon injections in October 2017. He has completed three Firmagon injections and underwent a DEXA scan (1/18) which revealed osteopenia. In regards to his urinary health, he reports mild urgency and frequency but states that it does not interfere with his daily activities. He denies weakened stream, retention, gross hematuria, or nocturia. He does report lower back pain but states that he has harbored back pain for several years. He is status post Robotic repair of right indirect and direct inguinal hernia and left direct inguinal hernia with mesh on July 25, 2017. In regards to his general medical health, he denies cough, dyspnea, chest pain, unexplained fevers, weight changes, fatigue, hematochezia, or dyschezia. He denies any significant hot flashes, night sweats, or fatigue. He presents today to discuss his most recent PSA level and continuation of therapy. Past Medical History:   Diagnosis Date    BBB (bundle branch block)     FH: CAD (coronary artery disease)     Gout     HLD (hyperlipidemia)     Hypertension     Malignant neoplasm of prostate ([facility] [address]     Obesity        Past Surgical History:   Procedure Laterality Date    CARDIOVASCULAR STRESS TEST  9 29 2010    Cannot exclude mild inferior stress induced ischemia. EF 58%.  Mildly abnormal nuclear scan    COLONOSCOPY  05/2017    COLONOSCOPY  06/02/2017    Dr Des Acuña Bilateral 7/25/2017    HERNIA INGUINAL REPAIR LAPAROSCOPIC ROBOTIC performed by Jet Osullivan MD at [address] [place] [address]  2012    TRANSTHORACIC ECHOCARDIOGRAM  9 29 2010    LV size and systolic function normal. EF 55-65%. Grade 1 diastolic dysfunction. Mild TR. Current Outpatient Prescriptions on File Prior to Visit   Medication Sig Dispense Refill    lisinopril (PRINIVIL;ZESTRIL) 5 MG tablet Take 5 mg by mouth daily      Cholecalciferol (VITAMIN D3) 2000 units CAPS Take by mouth daily      Acetaminophen (ARTHRITIS PAIN RELIEF PO) Take by mouth Indications: as needed for pain      aspirin 81 MG tablet Take 81 mg by mouth daily.  Multiple Vitamin (MULTI-VITAMINS PO) Take by mouth daily       Current Facility-Administered Medications on File Prior to Visit   Medication Dose Route Frequency Provider Last Rate Last Dose    degarelix (FIRMAGON) 120 mg subcutaneous  240 mg Subcutaneous Once Donna Wesley PA-C           No Known Allergies    Family History   Problem Relation Age of Onset    Heart Disease Mother     Heart Disease Father     Cancer Father        Social History     Social History    Marital status: Legally      Spouse name: N/A    Number of children: N/A    Years of education: N/A     Occupational History    Not on file. Social History Main Topics    Smoking status: Current Every Day Smoker     Packs/day: 1.00     Types: Cigarettes    Smokeless tobacco: Never Used      Comment: less than a pack a day    Alcohol use Yes      Comment: rarely    Drug use: No    Sexual activity: Not on file     Other Topics Concern    Not on file     Social History Narrative    No narrative on file       Review of Systems  General: Denies fever/chills, night sweats, fatigue or weight loss  HEENT: Denies headache, hearing loss, sore throat, difficulty swallowing  Pulmonary: Positive for chronic exertional dyspnea. Denies wheezing, hemoptysis. Cardiac: Denies recent chest pain, palpitations  GI: Denies nausea or vomiting, abdominal pain, bowel irregularity  : As above.   Neuro: Denies dizziness or light headedness,    Musculoskeletal: Reports mild,

## 2018-07-26 ENCOUNTER — OFFICE VISIT (OUTPATIENT)
Dept: UROLOGY | Age: 68
End: 2018-07-26
Payer: MEDICARE

## 2018-07-26 VITALS
BODY MASS INDEX: 26.07 KG/M2 | DIASTOLIC BLOOD PRESSURE: 60 MMHG | HEIGHT: 69 IN | WEIGHT: 176 LBS | SYSTOLIC BLOOD PRESSURE: 108 MMHG

## 2018-07-26 DIAGNOSIS — C61 PROSTATE CANCER (HCC): Primary | ICD-10-CM

## 2018-07-26 DIAGNOSIS — R82.89 ABNORMAL URINE CYTOLOGY: ICD-10-CM

## 2018-07-26 DIAGNOSIS — M81.8 IDIOPATHIC OSTEOPOROSIS: ICD-10-CM

## 2018-07-26 LAB
BILIRUBIN URINE: ABNORMAL
BLOOD URINE, POC: NEGATIVE
CHARACTER, URINE: CLEAR
COLOR, URINE: YELLOW
GLUCOSE URINE: NEGATIVE MG/DL
KETONES, URINE: ABNORMAL
LEUKOCYTE CLUMPS, URINE: NEGATIVE
NITRITE, URINE: NEGATIVE
PH, URINE: 5
PROTEIN, URINE: ABNORMAL MG/DL
SPECIFIC GRAVITY, URINE: >= 1.03 (ref 1–1.03)
UROBILINOGEN, URINE: 0.2 EU/DL

## 2018-07-26 PROCEDURE — 99213 OFFICE O/P EST LOW 20 MIN: CPT | Performed by: PHYSICIAN ASSISTANT

## 2018-07-26 PROCEDURE — 96401 CHEMO ANTI-NEOPL SQ/IM: CPT | Performed by: PHYSICIAN ASSISTANT

## 2018-07-26 PROCEDURE — 99999 PR OFFICE/OUTPT VISIT,PROCEDURE ONLY: CPT | Performed by: PHYSICIAN ASSISTANT

## 2018-07-26 PROCEDURE — 81003 URINALYSIS AUTO W/O SCOPE: CPT | Performed by: PHYSICIAN ASSISTANT

## 2018-07-26 PROCEDURE — 96402 CHEMO HORMON ANTINEOPL SQ/IM: CPT | Performed by: PHYSICIAN ASSISTANT

## 2018-07-26 NOTE — PROGRESS NOTES
Following Jud POWERS plan of care. LUPRON 45 MG GIVEN I.M left UOQ HIP  Lot Number: [id number]  Expiration Date: 11/13/2019  [facility] #: [id number]    After Injection was given there were no reactions at injection site and patient was feeling well. Patient was notified that possible side effects from injections include: Redness, swelling and itching at the injection site. Possible side effects of androgen deprivation therapy, including hot flashes, flushing of the skin, increased weight, decreased sex drive, and difficulties with ED. Patient was instructed to call the office with any further questions or concerns. Date of last Calcium/Vit D level: 06/23/17 new order today  Is patient on Calcium/Vit D replacement? yes  Date of last Dexa Scan:  Testosterone Level of <20.0 done on 11/17/17  Psa level of <0.014 done on 07/24/18    Patient supplied their own medications Yes      [address] therapy first initiated  firmagon on 10/26/17  Lupron and prolia initiated on 01/25/18. Following Jud POWERS plan of care. PROLIA 60MG GIVEN left S.C Arm  Lot Number: [id number]  Expiration Date: 08/2020  [facility] #: [id number]    After Injection was given there were no reactions at injection site and patient was feeling well. Patient was notified that possible side effects from injections include: Redness, swelling and itching at the injection site. Possible side effects of androgen deprivation therapy, including hot flashes, flushing of the skin, increased weight, decreased sex drive, and difficulties with ED. Patient was instructed to inform their dental office that they are receiving Prolia and that major dental procedures should be avoided. Patient was advised to call our office with any further questions or concerns. Any active dental problems, infections, or pain? No    Date of last dental appointment: ?     Any planned dental procedures? no    Patient supplied their own medications No    [address] therapy first

## 2018-07-27 LAB — CYTOLOGY-NON GYN: NORMAL

## 2018-08-27 ENCOUNTER — HOSPITAL ENCOUNTER (OUTPATIENT)
Age: 68
Discharge: HOME | End: 2018-08-27
Payer: MEDICARE

## 2018-08-27 ENCOUNTER — HOSPITAL ENCOUNTER (OUTPATIENT)
Dept: GENERAL RADIOLOGY | Age: 68
Discharge: HOME | End: 2018-08-27
Payer: MEDICARE

## 2018-08-27 DIAGNOSIS — M92.40: ICD-10-CM

## 2018-08-27 PROCEDURE — 73564 X-RAY EXAM KNEE 4 OR MORE: CPT

## 2018-10-12 ENCOUNTER — TELEPHONE (OUTPATIENT)
Dept: UROLOGY | Age: 68
End: 2018-10-12

## 2018-10-12 DIAGNOSIS — R82.89 ABNORMAL URINE CYTOLOGY: Primary | ICD-10-CM

## 2018-10-20 LAB — PSA, ULTRASENSITIVE: <0.014 NG/ML

## 2018-10-22 ENCOUNTER — TELEPHONE (OUTPATIENT)
Dept: UROLOGY | Age: 68
End: 2018-10-22

## 2018-10-25 ENCOUNTER — TELEPHONE (OUTPATIENT)
Dept: UROLOGY | Age: 68
End: 2018-10-25

## 2018-10-25 ENCOUNTER — NURSE ONLY (OUTPATIENT)
Dept: UROLOGY | Age: 68
End: 2018-10-25

## 2018-10-25 DIAGNOSIS — C61 CANCER OF PROSTATE (HCC): Primary | ICD-10-CM

## 2018-10-25 PROCEDURE — 99999 PR OFFICE/OUTPT VISIT,PROCEDURE ONLY: CPT | Performed by: PHYSICIAN ASSISTANT

## 2018-11-01 LAB — TESTOSTERONE FREE-MALE: <5 PG/ML (ref 47–244)

## 2018-11-16 ENCOUNTER — TELEPHONE (OUTPATIENT)
Dept: UROLOGY | Age: 68
End: 2018-11-16

## 2018-11-26 LAB
SEX HORMONE BINDING GLOBULIN: 70.7 NMOL/L (ref 19.3–76.4)
TESTOSTERONE FREE, CALC: <1.3 PG/ML (ref 47–244)
TESTOSTERONE FREE: <12 NG/DL (ref 300–720)

## 2018-12-18 LAB — PSA, ULTRASENSITIVE: <0.014 NG/ML

## 2019-01-28 LAB — PROSTATE SPECIFIC ANTIGEN: NORMAL NG/ML

## 2019-01-29 ENCOUNTER — TELEPHONE (OUTPATIENT)
Dept: UROLOGY | Age: 69
End: 2019-01-29

## 2019-02-14 ENCOUNTER — OFFICE VISIT (OUTPATIENT)
Dept: UROLOGY | Age: 69
End: 2019-02-14
Payer: MEDICARE

## 2019-02-14 ENCOUNTER — NURSE ONLY (OUTPATIENT)
Dept: UROLOGY | Age: 69
End: 2019-02-14
Payer: MEDICARE

## 2019-02-14 VITALS
DIASTOLIC BLOOD PRESSURE: 78 MMHG | SYSTOLIC BLOOD PRESSURE: 136 MMHG | HEIGHT: 69 IN | WEIGHT: 177 LBS | BODY MASS INDEX: 26.22 KG/M2

## 2019-02-14 DIAGNOSIS — C61 CANCER OF PROSTATE (HCC): Primary | ICD-10-CM

## 2019-02-14 DIAGNOSIS — C61 CANCER OF PROSTATE (HCC): ICD-10-CM

## 2019-02-14 DIAGNOSIS — M81.8 IDIOPATHIC OSTEOPOROSIS: Primary | ICD-10-CM

## 2019-02-14 LAB
BILIRUBIN, POC: NORMAL
BLOOD URINE, POC: NORMAL
CLARITY, POC: NORMAL
COLOR, POC: NORMAL
GLUCOSE URINE, POC: NORMAL
KETONES, POC: NORMAL
LEUKOCYTE EST, POC: NORMAL
NITRITE, POC: NORMAL
PH, POC: NORMAL
PROTEIN, POC: NORMAL
SPECIFIC GRAVITY, POC: NORMAL
UROBILINOGEN, POC: NORMAL

## 2019-02-14 PROCEDURE — 96401 CHEMO ANTI-NEOPL SQ/IM: CPT | Performed by: UROLOGY

## 2019-02-14 PROCEDURE — 99999 PR OFFICE/OUTPT VISIT,PROCEDURE ONLY: CPT | Performed by: UROLOGY

## 2019-02-14 PROCEDURE — 81003 URINALYSIS AUTO W/O SCOPE: CPT | Performed by: PHYSICIAN ASSISTANT

## 2019-02-14 PROCEDURE — 96402 CHEMO HORMON ANTINEOPL SQ/IM: CPT | Performed by: UROLOGY

## 2019-02-14 PROCEDURE — 99213 OFFICE O/P EST LOW 20 MIN: CPT | Performed by: PHYSICIAN ASSISTANT

## 2019-02-14 RX ORDER — FENOFIBRATE 145 MG/1
145 TABLET, COATED ORAL DAILY
COMMUNITY

## 2019-04-09 ENCOUNTER — HOSPITAL ENCOUNTER (OUTPATIENT)
Dept: INTERVENTIONAL RADIOLOGY/VASCULAR | Age: 69
Discharge: HOME | End: 2019-04-09

## 2019-04-09 DIAGNOSIS — Z13.6 ENCOUNTER FOR SCREENING FOR VASCULAR DISEASE: ICD-10-CM

## 2019-04-09 PROCEDURE — 9900000021 US VASCULAR SCREENING

## 2019-04-23 LAB
PSA, ULTRASENSITIVE: <0.01 NG/ML (ref 0–4)
TESTOSTERONE TOTAL: 0.11 NG/ML (ref 1.68–7.46)

## 2019-04-24 LAB
ANION GAP SERPL CALCULATED.3IONS-SCNC: 9 MMOL/L (ref 4–12)
BUN BLDV-MCNC: 25 MG/DL (ref 5–27)
CALCIUM SERPL-MCNC: 10.5 MG/DL (ref 8.5–10.5)
CHLORIDE BLD-SCNC: 109 MMOL/L (ref 98–109)
CO2: 23 MMOL/L (ref 22–32)
CREAT SERPL-MCNC: 1.31 MG/DL (ref 0.6–1.3)
EGFR AFRICAN AMERICAN: >60 ML/MIN/1.73SQ.M
EGFR IF NONAFRICAN AMERICAN: 54 ML/MIN/1.73SQ.M
GLUCOSE: 114 MG/DL (ref 65–99)
POTASSIUM SERPL-SCNC: 4.3 MMOL/L (ref 3.5–5)
SODIUM BLD-SCNC: 141 MMOL/L (ref 134–146)

## 2019-04-27 ENCOUNTER — TELEPHONE (OUTPATIENT)
Dept: UROLOGY | Age: 69
End: 2019-04-27

## 2019-04-27 NOTE — TELEPHONE ENCOUNTER
Patient's PSA and testosterone are negligible indicating treatment response. Please let him know that his blood glucose is elevated at 114. I have no recent labs to compare this to. Will send a copy to Dr. Jaimes Senior office for review.

## 2019-04-29 NOTE — TELEPHONE ENCOUNTER
The patient was advised of the message from [facility]. Patient's PSA and testosterone are negligible indicating treatment response. Blood glucose is elevated at 114. He voiced understanding. I routed the labs to Dr Shira Pedraza office. The patient stated he had a glucose level completed on 03/23/2019 and it was 97. Thank you.

## 2019-07-22 LAB
PSA, ULTRASENSITIVE: <0.01 NG/ML (ref 0–4)
TESTOSTERONE TOTAL: 0.14 NG/ML (ref 1.68–7.46)

## 2019-08-15 ENCOUNTER — OFFICE VISIT (OUTPATIENT)
Dept: UROLOGY | Age: 69
End: 2019-08-15
Payer: MEDICARE

## 2019-08-15 ENCOUNTER — TELEPHONE (OUTPATIENT)
Dept: UROLOGY | Age: 69
End: 2019-08-15

## 2019-08-15 ENCOUNTER — NURSE ONLY (OUTPATIENT)
Dept: UROLOGY | Age: 69
End: 2019-08-15
Payer: MEDICARE

## 2019-08-15 VITALS
DIASTOLIC BLOOD PRESSURE: 74 MMHG | HEIGHT: 69 IN | BODY MASS INDEX: 26.36 KG/M2 | WEIGHT: 178 LBS | SYSTOLIC BLOOD PRESSURE: 116 MMHG

## 2019-08-15 DIAGNOSIS — M81.8 IDIOPATHIC OSTEOPOROSIS: Primary | ICD-10-CM

## 2019-08-15 DIAGNOSIS — R39.198 DIFFICULTY URINATING: ICD-10-CM

## 2019-08-15 DIAGNOSIS — M81.0 OSTEOPOROSIS, UNSPECIFIED OSTEOPOROSIS TYPE, UNSPECIFIED PATHOLOGICAL FRACTURE PRESENCE: ICD-10-CM

## 2019-08-15 DIAGNOSIS — C61 PROSTATE CANCER (HCC): Primary | ICD-10-CM

## 2019-08-15 DIAGNOSIS — C61 CANCER OF PROSTATE (HCC): ICD-10-CM

## 2019-08-15 DIAGNOSIS — R31.29 MICROSCOPIC HEMATURIA: ICD-10-CM

## 2019-08-15 LAB
BILIRUBIN URINE: ABNORMAL
BLOOD URINE, POC: NEGATIVE
CHARACTER, URINE: CLEAR
COLOR, URINE: YELLOW
GLUCOSE URINE: NEGATIVE MG/DL
KETONES, URINE: ABNORMAL
LEUKOCYTE CLUMPS, URINE: NEGATIVE
NITRITE, URINE: NEGATIVE
PH, URINE: 5 (ref 5–9)
POST VOID RESIDUAL (PVR): 0 ML
PROTEIN, URINE: 30 MG/DL
SPECIFIC GRAVITY, URINE: >= 1.03 (ref 1–1.03)
UROBILINOGEN, URINE: 1 EU/DL (ref 0–1)

## 2019-08-15 PROCEDURE — 51798 US URINE CAPACITY MEASURE: CPT | Performed by: PHYSICIAN ASSISTANT

## 2019-08-15 PROCEDURE — 99213 OFFICE O/P EST LOW 20 MIN: CPT | Performed by: PHYSICIAN ASSISTANT

## 2019-08-15 PROCEDURE — 96402 CHEMO HORMON ANTINEOPL SQ/IM: CPT | Performed by: UROLOGY

## 2019-08-15 PROCEDURE — 81003 URINALYSIS AUTO W/O SCOPE: CPT | Performed by: PHYSICIAN ASSISTANT

## 2019-08-15 PROCEDURE — 96401 CHEMO ANTI-NEOPL SQ/IM: CPT | Performed by: UROLOGY

## 2019-08-15 RX ORDER — TOLTERODINE 4 MG/1
CAPSULE, EXTENDED RELEASE ORAL
COMMUNITY
Start: 2019-06-12 | End: 2020-02-20

## 2019-08-15 RX ORDER — LOSARTAN POTASSIUM 100 MG/1
TABLET ORAL
COMMUNITY
Start: 2019-06-12

## 2019-08-15 NOTE — PROGRESS NOTES
Scan:  Testosterone Level of 0.14 done on 7-22-19  Psa level of <0.01 done on 7-22-19  Patient supplied their own medications No      Pt [address] therapy first initiated on 10-26-17.

## 2019-08-15 NOTE — PROGRESS NOTES
Mr. Author Jeff is a 78-year-old male with a history of prostate cancer. He is status post RALP with bilateral lymph node dissection in 6/12. His pathology was significant for [place] 4+5=9 disease with negative margins and negative nodes. He underwent 6600 cGy external beam radiation therapy in August 2016. Unfortunately, his PSA level started to rise and he was started on Firmagon injections in October 2017. He completed three Firmagon injections and underwent a DEXA scan (1/18) which revealed osteopenia. He was started on Lupron and Prolia 6 month injections in January 2018. In regards to his urinary health, he reports continued incontinence with movement and coughing. Rarely, he leaks urine while sitting and is unaware of this leakage until he stands up and his pants are wet. He does experience urinary urgency through the day but denies nocturia. He was started on Detrol LA approximately four months ago and denies any improvement. He has been trying to perform pelvic floor exercises but he cannot remember many of these since he underwent therapy six years ago. He denies weakened stream, retention, gross hematuria, or nocturia. In regards to his general medical health, he denies cough, dyspnea, chest pain, unexplained fevers, weight changes, fatigue, hematochezia, or dyschezia. He denies any significant hot flashes, night sweats, or fatigue. He presents today to discuss his most recent PSA level and continuation of therapy. Past Medical History:   Diagnosis Date    BBB (bundle branch block)     FH: CAD (coronary artery disease)     Gout     HLD (hyperlipidemia)     Hypertension     Malignant neoplasm of prostate ([facility] [address]     Obesity        Past Surgical History:   Procedure Laterality Date    CARDIOVASCULAR STRESS TEST  9 29 2010    Cannot exclude mild inferior stress induced ischemia. EF 58%.  Mildly abnormal nuclear scan    COLONOSCOPY  05/2017    COLONOSCOPY  06/02/2017    Dr Jarrett Leggett Used    Tobacco comment: less than a pack a day   Substance and Sexual Activity    Alcohol use: Yes     Comment: rarely    Drug use: No    Sexual activity: Not on file   Lifestyle    Physical activity:     Days per week: Not on file     Minutes per session: Not on file    Stress: Not on file   Relationships    Social connections:     Talks on phone: Not on file     Gets together: Not on file     Attends Cheondo[employer] service: Not on file     Active member of club or organization: Not on file     Attends meetings of clubs or organizations: Not on file     Relationship status: Not on file    Intimate partner violence:     Fear of current or ex partner: Not on file     Emotionally abused: Not on file     Physically abused: Not on file     Forced sexual activity: Not on file   Other Topics Concern    Not on file   Social History Narrative    Not on file       Review of Systems  General: Denies fever/chills, night sweats, fatigue or weight loss  HEENT: Denies headache, hearing loss, sore throat, difficulty swallowing  Pulmonary: Positive for chronic exertional dyspnea. Denies wheezing, hemoptysis. Cardiac: Denies recent chest pain, palpitations  GI: Denies nausea or vomiting, abdominal pain, bowel irregularity  : As above. Neuro: Denies dizziness or light headedness,    Musculoskeletal: Reports mild, chronic lower back pain. Denies myalgias.     Exam    /74   Ht 5' 9\" (1.753 m)   Wt 178 lb (80.7 kg)   BMI 26.29 kg/m²      General: alert and oriented. Cooperative.    HENT: Normocephalic, Atraumatic. Eyes: No scleral icterus, PERRL, conjunctiva pink  Neck: Supple. No thyromegaly. Heart: RRR with slight DANNY. S1 and S2 normal.  Respiratory: Effort normal. CTA bilaterally without rales, rhonchi, wheezes. Abdomen: Soft, Non-tender. Active BS. Skin: No rashes or obvious lesions.   Nursing note and vitals reviewed      Labs    Results for POC orders placed in visit on 08/15/19   POCT Urinalysis No Micro Cells on urine cytology- Status post negative office cystoscopy in January 2017. CT scan of abdomen and pelvis in April 2017 revealed nonobstructive nephrolithiasis of the inferior pole calyces of the right kidney. No evidence of malignancy. Urine cytology in July 2018 was significant for atypical cells. Bladder Cx in October 2018 was normal. Continue to monitor. Urine cytology today.     3. Inguinal Hernias- Resolved. Status post Robotic repair of right indirect and direct inguinal hernia and left direct inguinal hernia with mesh on July 25, 2017.     4. Osteopenia- DEXA scan 1/18 revealed osteopenia. Started Prolia injections every six months. Repeat DEXA in 1/2020.     5. Mixed Incontinence- Symptomatic. On Detrol LA. Patient declined discontinuation of Detrol to try different ant-cholinergic. Patient declined office cystoscopy with Dr. Barry Marte to assess sphincter tone or rule out other pathology. Refused referral to [facility] for pelvic floor therapy. Continue to monitor.

## 2019-08-16 LAB — CYTOLOGY-NON GYN: NORMAL

## 2019-08-28 ENCOUNTER — TELEPHONE (OUTPATIENT)
Dept: UROLOGY | Age: 69
End: 2019-08-28

## 2019-08-28 NOTE — TELEPHONE ENCOUNTER
Patient declined to have a CT scan of the abdomen and pelvis at this time to assess stone burden but did state that he would call the office immediately with severe flank pain, irritative/obstructive symptomatology, and fever/chills. Patient does have crystals noted on his cytology. Declined [facility] at this time. Will discuss additional testing once again at his next appointment.

## 2019-10-22 LAB
ANION GAP SERPL CALCULATED.3IONS-SCNC: 13 MMOL/L (ref 4–12)
BUN BLDV-MCNC: 21 MG/DL (ref 5–27)
CALCIUM SERPL-MCNC: 9.4 MG/DL (ref 8.5–10.5)
CHLORIDE BLD-SCNC: 110 MMOL/L (ref 98–109)
CO2: 19 MMOL/L (ref 22–32)
CREAT SERPL-MCNC: 1.3 MG/DL (ref 0.6–1.3)
EGFR AFRICAN AMERICAN: >60 ML/MIN/1.73SQ.M
EGFR IF NONAFRICAN AMERICAN: 54 ML/MIN/1.73SQ.M
GLUCOSE: 106 MG/DL (ref 65–99)
POTASSIUM SERPL-SCNC: 4 MMOL/L (ref 3.5–5)
PSA, ULTRASENSITIVE: <0.01 NG/ML (ref 0–4)
SODIUM BLD-SCNC: 142 MMOL/L (ref 134–146)
TESTOSTERONE TOTAL: 0.17 NG/ML (ref 1.68–7.46)

## 2019-10-25 ENCOUNTER — TELEPHONE (OUTPATIENT)
Dept: UROLOGY | Age: 69
End: 2019-10-25

## 2020-01-20 ENCOUNTER — HOSPITAL ENCOUNTER (OUTPATIENT)
Dept: WOMENS IMAGING | Age: 70
Discharge: HOME | End: 2020-01-20
Payer: MEDICARE

## 2020-01-20 PROCEDURE — 77080 DXA BONE DENSITY AXIAL: CPT

## 2020-01-27 LAB
PROSTATE SPECIFIC ANTIGEN: NORMAL
TESTOSTERONE TOTAL: 0.17

## 2020-02-03 ENCOUNTER — TELEPHONE (OUTPATIENT)
Dept: UROLOGY | Age: 70
End: 2020-02-03

## 2020-02-03 NOTE — TELEPHONE ENCOUNTER
Per Elyssa Dobbins of [employer] Lupron does not need a prior auth. Ref #[id number]  Arturo does and is approved from 2/20/20-2/20/21 for a total of 2 txs.   Ref # [id number] for Prolia

## 2020-02-20 ENCOUNTER — NURSE ONLY (OUTPATIENT)
Dept: UROLOGY | Age: 70
End: 2020-02-20
Payer: MEDICARE

## 2020-02-20 ENCOUNTER — OFFICE VISIT (OUTPATIENT)
Dept: UROLOGY | Age: 70
End: 2020-02-20
Payer: MEDICARE

## 2020-02-20 VITALS
SYSTOLIC BLOOD PRESSURE: 138 MMHG | WEIGHT: 184 LBS | HEIGHT: 68 IN | DIASTOLIC BLOOD PRESSURE: 62 MMHG | BODY MASS INDEX: 27.89 KG/M2

## 2020-02-20 LAB
BILIRUBIN URINE: NEGATIVE
BLOOD URINE, POC: ABNORMAL
CHARACTER, URINE: CLEAR
COLOR, URINE: YELLOW
GLUCOSE URINE: NEGATIVE MG/DL
KETONES, URINE: NEGATIVE
LEUKOCYTE CLUMPS, URINE: NEGATIVE
NITRITE, URINE: NEGATIVE
PH, URINE: 5.5 (ref 5–9)
PROTEIN, URINE: NEGATIVE MG/DL
SPECIFIC GRAVITY, URINE: >= 1.03 (ref 1–1.03)
UROBILINOGEN, URINE: 0.2 EU/DL (ref 0–1)

## 2020-02-20 PROCEDURE — 96401 CHEMO ANTI-NEOPL SQ/IM: CPT | Performed by: UROLOGY

## 2020-02-20 PROCEDURE — 99213 OFFICE O/P EST LOW 20 MIN: CPT | Performed by: PHYSICIAN ASSISTANT

## 2020-02-20 PROCEDURE — 96402 CHEMO HORMON ANTINEOPL SQ/IM: CPT | Performed by: UROLOGY

## 2020-02-20 PROCEDURE — 81003 URINALYSIS AUTO W/O SCOPE: CPT | Performed by: PHYSICIAN ASSISTANT

## 2020-02-20 RX ORDER — CYANOCOBALAMIN (VITAMIN B-12) 2000 MCG
TABLET ORAL PRN
COMMUNITY
End: 2021-02-18

## 2020-02-20 NOTE — PROGRESS NOTES
Mr. Lynn Beltre is a 68-year-old male with a history of prostate cancer. He is status post RALP with bilateral lymph node dissection in 6/12. His pathology was significant for [place] 4+5=9 disease with negative margins and negative nodes. He underwent 6600 cGy external beam radiation therapy in August 2016. Unfortunately, his PSA level started to rise and he was started on Firmagon injections in October 2017. He completed three Firmagon injections and underwent a DEXA scan (1/18) which revealed osteopenia. He was started on Lupron and Prolia 6 month injections in January 2018. His most recent DEXA scan (1/20) revealed continued osteopenia with mild improvement compared to his 2018 study.     In regards to his urinary health, he reports mild incontinence with movement and coughing. He does experience urinary urgency through the day but denies nocturia. He was started on Detrol LA but it was ineffective. He started Myrbetriq 25 mg daily and this has been working more effectively. He denies weakened stream, retention, gross hematuria, or nocturia.      In regards to his general medical health, he denies cough, dyspnea, chest pain, unexplained fevers, weight changes, fatigue, hematochezia, or dyschezia. He denies any significant hot flashes, night sweats, or fatigue. He presents today to discuss his most recent PSA level and continuation of therapy. Past Medical History:   Diagnosis Date    BBB (bundle branch block)     FH: CAD (coronary artery disease)     Gout     HLD (hyperlipidemia)     Hypertension     Malignant neoplasm of prostate ([address]     Obesity        Past Surgical History:   Procedure Laterality Date    CARDIOVASCULAR STRESS TEST  9 29 2010    Cannot exclude mild inferior stress induced ischemia. EF 58%.  Mildly abnormal nuclear scan    COLONOSCOPY  05/2017    COLONOSCOPY  06/02/2017    Dr Libia Lawrence Bilateral 7/25/2017    HERNIA INGUINAL REPAIR LAPAROSCOPIC ROBOTIC performed by Arben Murrieta MD at [address]  2012    TRANSTHORACIC ECHOCARDIOGRAM  9 29 2010    LV size and systolic function normal. EF 55-65%. Grade 1 diastolic dysfunction. Mild TR. Current Outpatient Medications on File Prior to Visit   Medication Sig Dispense Refill    Cyanocobalamin (B-12) 2000 MCG TABS Take by mouth      Omega-3 Fatty Acids (OMEGA 3 500 PO) Take by mouth      mirabegron (MYRBETRIQ) 25 MG TB24 Take 25 mg by mouth daily      losartan (COZAAR) 100 MG tablet       CALCIUM PO Take by mouth daily      fenofibrate (TRICOR) 145 MG tablet Take 145 mg by mouth daily      aspirin 81 MG tablet Take 81 mg by mouth daily.  CINNAMON PO Take by mouth      leuprolide (LUPRON DEPOT, 6-MONTH,) 45 MG injection Inject 45 mg into the muscle once      denosumab (PROLIA) 60 MG/ML SOLN SC injection Inject 60 mg into the skin once      Cholecalciferol (VITAMIN D3) 2000 units CAPS Take by mouth daily      Multiple Vitamin (MULTI-VITAMINS PO) Take by mouth daily       Current Facility-Administered Medications on File Prior to Visit   Medication Dose Route Frequency Provider Last Rate Last Dose    degarelix (FIRMAGON) 120 mg subcutaneous  240 mg Subcutaneous Once Donna Wesley PA-C           No Known Allergies    Family History   Problem Relation Age of Onset    Heart Disease Mother     Heart Disease Father     Cancer Father        Social History     Socioeconomic History    Marital status:       Spouse name: Not on file    Number of children: Not on file    Years of education: Not on file    Highest education level: Not on file   Occupational History    Not on file   Social Needs    Financial resource strain: Not on file    Food insecurity:     Worry: Not on file     Inability: Not on file    Transportation needs:     Medical: Not on file     Non-medical: Not on file   Tobacco Use    Smoking status: Current Every Day Smoker     Packs/day: 0.75     Types: Cigarettes    Smokeless tobacco: Never Used    Tobacco comment: less than a pack a day   Substance and Sexual Activity    Alcohol use: Yes     Comment: rarely    Drug use: No    Sexual activity: Not on file   Lifestyle    Physical activity:     Days per week: Not on file     Minutes per session: Not on file    Stress: Not on file   Relationships    Social connections:     Talks on phone: Not on file     Gets together: Not on file     Attends Scientologist service: Not on file     Active member of club or organization: Not on file     Attends meetings of clubs or organizations: Not on file     Relationship status: Not on file    Intimate partner violence:     Fear of current or ex partner: Not on file     Emotionally abused: Not on file     Physically abused: Not on file     Forced sexual activity: Not on file   Other Topics Concern    Not on file   Social History Narrative    Not on file       Review of Systems  General: Denies fever/chills, night sweats, fatigue or weight loss  HEENT: Denies headache, hearing loss, sore throat, difficulty swallowing  Pulmonary: Positive for chronic exertional dyspnea. Denies wheezing, hemoptysis. Cardiac: Denies recent chest pain, palpitations  GI: Denies nausea or vomiting, abdominal pain, bowel irregularity  : As above. Neuro: Denies dizziness or light headedness,    Musculoskeletal: Reports mild, chronic lower back pain. Denies myalgias.     Exam    /62   Ht 5' 7.5\" (1.715 m)   Wt 184 lb (83.5 kg)   BMI 28.39 kg/m²     General: Alert and oriented. Cooperative.    HENT: Normocephalic, Atraumatic. Eyes: No scleral icterus, PERRL, conjunctiva pink  Neck: Supple. No thyromegaly. Heart: RRR with slight DANNY. S1 and S2 normal.  Respiratory: Effort normal. CTA bilaterally without rales, rhonchi, wheezes. Abdomen: Soft, Non-tender. Active BS. Skin: No rashes or obvious lesions.   Nursing note and vitals reviewed    Labs    Results for POC orders placed in visit the exam is    0.675  g/cm2. The T-score is -1.90 and the Z-score is -0. 60.     Since the previous similar exam of 01/17/2018, there has been a    +0.010 or +1.5% change in the BMD value  which represents no    significant interval change in bone density. This matches the    [address] [employer]'s criteria for osteopenia and places    the patient at a medium risk for fracture.         An additional bone density evaluation was performed 01/20/2020 on    the left femur neck using a Hologic unit. The BMD average for    the exam is 0.677  g/cm2. The T-score is -1.90 and the Z-score is    -0.60.  Since the previous similar exam of 01/17/2018, there has    been a +0.022 or +3.4% change in the BMD value  which represents    no significant interval change in bone density. This matches the   [address] [employer]'s criteria for osteopenia and places    the patient at a medium risk for fracture.         An additional bone density evaluation was performed 01/20/2020 on    the AP L1-L4 region of spine using a Hologic unit. The BMD    average for the exam is 1.074  g/cm2. The T-score is -0.20 and    the Z-score is 0.80.  Since the previous similar exam of    01/17/2018, there has been a +0.073 or +7.3% change in the BMD    value  which represents no significant interval change in bone    density. This matches the [address] [employer]'s criteria    for normal bone density and places the patient within normal    limits of fracture risk.         BMD change vs previous is 2.4% for the hips.  BMD change vs    previous is 7.2% for the spine. This is based on dissimilar scan    types or analysis methods.        This patient does not qualify for a FRAX assessment because the    patient has been treated for osteoporosis.           IMPRESSION: OSTEOPENIA   Patient is at medium risk for fracture.  Recheck of BMD in 1-2    years and patient consult w/primary care provider are          Plan:    1.  Prostate cancer- Status post RALP with bilateral lymph node dissection in 6/12. His pathology was significant for [place] 4+5=9 disease with negative margins and negative nodes. Status post 6600cGy external beam radiation in August 2016. He started [place] in October 2017 and has received three injections. His PSA has responded nicely to ADT. Switched to Lupron 45 mg IM injections (January 2018) every six months for convenience. DEXA scan (1/18) revealed osteopenia. Started Prolia every six months. Repeat DEXA scan in January 2020. Lupron 45 mg IM injection and Prolia today. Mr. Fern Alvarado will return in six months for status check. Call with any questions or concerns. PSA levels are monitored every three months and have been stable at <0.01. Will obtain PSA, testosterone, calcium every six months.      2. Persistent Microscopic Hematuria/History of Atypical Cells on urine cytology- Status post negative office cystoscopy in January 2017. CT scan of abdomen and pelvis in April 2017 revealed nonobstructive nephrolithiasis of the inferior pole calyces of the right kidney. No evidence of malignancy. Urine cytology in July 2018 was significant for atypical cells. Bladder Cx in October 2018 was normal. Continue to monitor. Urine cytology today.     3. Inguinal Hernias- Resolved. Status post Robotic repair of right indirect and direct inguinal hernia and left direct inguinal hernia with mesh on July 25, 2017.     4. Osteopenia- DEXA scan 1/18 revealed osteopenia. Started Prolia injections every six months. Repeat DEXA in 1/2020 reveals continued osteopenia but mild improvement. Continue Prolia, Vitamin D, and 1200 mg calcium daily. Discussed sources of dietary calcium versus calcium supplementation. Smoking cessation would be helpful     5. Mixed Incontinence- Myrbetriq 25 mg daily offering better control. Refused referral to [facility] for pelvic floor therapy. Continue to monitor.

## 2020-02-21 LAB — CYTOLOGY-NON GYN: NORMAL

## 2020-07-27 LAB
CALCIUM SERPL-MCNC: 10.3 MG/DL (ref 8.5–10.5)
PSA, ULTRASENSITIVE: <0.01 NG/ML (ref 0–4)
TESTOSTERONE TOTAL: 0.26 NG/ML (ref 1.68–7.46)

## 2020-08-17 NOTE — PROGRESS NOTES
Mr. Samaria Lovett is a 55-year-old male with a history of prostate cancer. He is status post RALP with bilateral lymph node dissection in 6/12. His pathology was significant for [place] 4+5=9 disease with negative margins and negative nodes. He underwent 6600 cGy external beam radiation therapy in August 2016. Unfortunately, his PSA level started to rise and he was started on Firmagon injections in October 2017. He completed three Firmagon injections and underwent a DEXA scan (1/18) which revealed osteopenia. He was started on Lupron and Prolia 6 month injections in January 2018. His most recent DEXA scan (1/20) revealed continued osteopenia with mild improvement compared to his 2018 study.     In regards to his urinary health, he reports mild incontinence with movement and coughing. He does experience urinary urgency through the day but denies nocturia. He was started on Detrol LA but it was ineffective. He started Myrbetriq 25 mg daily and this has been working more effectively but he still would like further improvment. He denies weakened stream, retention, gross hematuria, or nocturia.      In regards to his general medical health, he denies cough, dyspnea, chest pain, unexplained fevers, weight changes, fatigue, hematochezia, or dyschezia. He denies any significant hot flashes, night sweats, or fatigue. He presents today to discuss his most recent PSA level and continuation of therapy. Past Medical History:   Diagnosis Date    BBB (bundle branch block)     FH: CAD (coronary artery disease)     Gout     HLD (hyperlipidemia)     Hypertension     [facility] [address]     Obesity        Past Surgical History:   Procedure Laterality Date    CARDIOVASCULAR STRESS TEST  9 29 2010    Cannot exclude mild inferior stress induced ischemia. EF 58%.  Mildly abnormal nuclear scan    COLONOSCOPY  05/2017    COLONOSCOPY  06/02/2017    Dr Dorita Whitaker Bilateral 7/25/2017    HERNIA INGUINAL REPAIR LAPAROSCOPIC ROBOTIC performed by Leida Vidal MD at [address]  2012    TRANSTHORACIC ECHOCARDIOGRAM  9 29 2010    LV size and systolic function normal. EF 55-65%. Grade 1 diastolic dysfunction. Mild TR. Current Outpatient Medications on File Prior to Visit   Medication Sig Dispense Refill    Cyanocobalamin (B-12) 2000 MCG TABS Take by mouth as needed       Omega-3 Fatty Acids (OMEGA 3 500 PO) Take by mouth      mirabegron (MYRBETRIQ) 25 MG TB24 Take 25 mg by mouth daily      losartan (COZAAR) 100 MG tablet       CINNAMON PO Take by mouth as needed       fenofibrate (TRICOR) 145 MG tablet Take 145 mg by mouth daily      leuprolide (LUPRON DEPOT, 6-MONTH,) 45 MG injection Inject 45 mg into the muscle once      denosumab (PROLIA) 60 MG/ML SOLN SC injection Inject 60 mg into the skin once      aspirin 81 MG tablet Take 81 mg by mouth daily.  CALCIUM PO Take by mouth daily      Cholecalciferol (VITAMIN D3) 2000 units CAPS Take by mouth daily      Multiple Vitamin (MULTI-VITAMINS PO) Take by mouth daily       Current Facility-Administered Medications on File Prior to Visit   Medication Dose Route Frequency Provider Last Rate Last Dose    degarelix (FIRMAGON) 120 mg subcutaneous  240 mg Subcutaneous Once Donna Wesley PA-C           No Known Allergies    Family History   Problem Relation Age of Onset    Heart Disease Mother     Heart Disease Father     Cancer Father        Social History     Socioeconomic History    Marital status:       Spouse name: Not on file    Number of children: Not on file    Years of education: Not on file    Highest education level: Not on file   Occupational History    Not on file   Social Needs    Financial resource strain: Not on file    Food insecurity     Worry: Not on file     Inability: Not on file    Transportation needs     Medical: Not on file     Non-medical: Not on file   Tobacco Use    Smoking status: Current Every Day Smoker     Packs/day: 1.00     Years: 50.00     Pack years: 50.00     Types: Cigarettes    Smokeless tobacco: Never Used   Substance and Sexual Activity    Alcohol use: Yes     Comment: rarely    Drug use: No    Sexual activity: Not on file   Lifestyle    Physical activity     Days per week: Not on file     Minutes per session: Not on file    Stress: Not on file   Relationships    Social connections     Talks on phone: Not on file     Gets together: Not on file     Attends Adventism service: Not on file     Active member of club or organization: Not on file     Attends meetings of clubs or organizations: Not on file     Relationship status: Not on file    Intimate partner violence     Fear of current or ex partner: Not on file     Emotionally abused: Not on file     Physically abused: Not on file     Forced sexual activity: Not on file   Other Topics Concern    Not on file   Social History Narrative    Not on file     Review of Systems:     General: Denies fever/chills, night sweats, fatigue or weight loss  HEENT: Denies headache, hearing loss, sore throat, difficulty swallowing  Pulmonary: Positive for chronic exertional dyspnea. Denies wheezing, hemoptysis. Cardiac: Denies recent chest pain, palpitations  GI: Denies nausea or vomiting, abdominal pain, bowel irregularity  : As above. Neuro: Denies dizziness or light headedness,    Musculoskeletal: Reports mild, chronic lower back pain. Denies myalgias.     Exam     General: Alert and oriented. Cooperative.    HENT: Normocephalic, Atraumatic. Eyes: No scleral icterus, PERRL, conjunctiva pink  Neck: Supple. No thyromegaly. Heart: RRR with slight DANNY. S1 and S2 normal.  Respiratory: Effort normal. CTA bilaterally without rales, rhonchi, wheezes. Abdomen: Soft, Non-tender. Active BS. Skin: No rashes or obvious lesions.   Nursing note and vitals reviewed    Labs    Results for POC orders placed in visit on 08/20/20   POCT Urinalysis No Micro (Auto)   Result Value Ref Range    Glucose, Ur Negative NEGATIVE mg/dl    Bilirubin Urine Negative     Ketones, Urine Negative NEGATIVE    Specific Gravity, Urine 1.025 1.002 - 1.03    Blood, UA POC Negative NEGATIVE    pH, Urine 5.50 5.0 - 9.0    Protein, Urine Negative NEGATIVE mg/dl    Urobilinogen, Urine 0.20 0.0 - 1.0 eu/dl    Nitrite, Urine Negative NEGATIVE    Leukocyte Clumps, Urine Negative NEGATIVE    Color, Urine Yellow YELLOW-STR    Character, Urine Clear [id number]       Lab Results   Component Value Date    CREATININE 1.30 10/22/2019    BUN 21 10/22/2019     10/22/2019    K 4.0 10/22/2019     (H) 10/22/2019    CO2 19 (L) 10/22/2019       Lab Results   Component Value Date    PSA  01/27/2020      Comment:      <0.01    PSA  01/28/2019      Comment:      <0.014    PSA 0.07 12/11/2017     PSA Testosterone Date   <0.01 0.26 07/27/20   <0.01 0.17 01/2020    <0.01  0.14  07/22/19   <0.014 10 01/2019   <0.014   10/2018   < 0.014   07/2018   <0.014   05/2018   <0.014   03/2018   0.01   01/11/2018   0.07   12/11/2017   1.34   10/18/17   1.18   09/09/2017   0.78   06/12/17   0.54   03/17   0.36   11/28/2016   0.08   05/06/2014      Calcium: 10.3    Plan:  1. Prostate cancer- Status post RALP with bilateral lymph node dissection in 6/12. His pathology was significant for [place] 4+5=9 disease with negative margins and negative nodes. Status post 6600cGy external beam radiation in August 2016. He started [place] in October 2017 and has received three injections. His PSA has responded nicely to ADT. Switched to Lupron 45 mg IM injections (January 2018) every six months for convenience. DEXA scan (1/18) revealed osteopenia. Started Prolia every six months. DEXA scan was repeated in January 2020. Lupron 45 mg IM injection and Prolia today. Mr. Keon Casey will return in six months for status check. Call with any questions or concerns.  PSA levels are monitored every three to six months and have been stable at <0.01. Will obtain PSA, testosterone, calcium every six months.      2. Persistent Microscopic Hematuria/History of Atypical Cells on urine cytology- Status post negative office cystoscopy in January 2017. CT scan of abdomen and pelvis in April 2017 revealed nonobstructive nephrolithiasis of the inferior pole calyces of the right kidney. No evidence of malignancy. Urine cytology in July 2018 was significant for atypical cells. Bladder Cx in October 2018 was normal. Continue to monitor. Urine cytology today.     3. Inguinal Hernias- Resolved. Status post Robotic repair of right indirect and direct inguinal hernia and left direct inguinal hernia with mesh on July 25, 2017.     4. Osteopenia- DEXA scan 1/18 revealed osteopenia. Started Prolia injections every six months. Repeat DEXA in 1/2020 reveals continued osteopenia but mild improvement. Continue Prolia, Vitamin D, and 1200 mg calcium daily. Discussed sources of dietary calcium versus calcium supplementation. Smoking cessation would be helpful     5. Mixed Incontinence- Myrbetriq 25 mg daily offering better control but he would like to optimize his therapy if possible. Will increase Myrbetriq to 50 mg daily. Gave four weeks of samples. Refused referral to [facility] for pelvic floor therapy. Continue to monitor.

## 2020-08-20 ENCOUNTER — NURSE ONLY (OUTPATIENT)
Dept: UROLOGY | Age: 70
End: 2020-08-20
Payer: MEDICARE

## 2020-08-20 ENCOUNTER — OFFICE VISIT (OUTPATIENT)
Dept: UROLOGY | Age: 70
End: 2020-08-20
Payer: MEDICARE

## 2020-08-20 VITALS — BODY MASS INDEX: 27.68 KG/M2 | WEIGHT: 182.6 LBS | TEMPERATURE: 97.5 F | HEIGHT: 68 IN

## 2020-08-20 LAB
BILIRUBIN URINE: NEGATIVE
BLOOD URINE, POC: NEGATIVE
CHARACTER, URINE: CLEAR
COLOR, URINE: YELLOW
GLUCOSE URINE: NEGATIVE MG/DL
KETONES, URINE: NEGATIVE
LEUKOCYTE CLUMPS, URINE: NEGATIVE
NITRITE, URINE: NEGATIVE
PH, URINE: 5.5 (ref 5–9)
PROTEIN, URINE: NEGATIVE MG/DL
SPECIFIC GRAVITY, URINE: 1.02 (ref 1–1.03)
UROBILINOGEN, URINE: 0.2 EU/DL (ref 0–1)

## 2020-08-20 PROCEDURE — 96401 CHEMO ANTI-NEOPL SQ/IM: CPT | Performed by: UROLOGY

## 2020-08-20 PROCEDURE — 81003 URINALYSIS AUTO W/O SCOPE: CPT | Performed by: PHYSICIAN ASSISTANT

## 2020-08-20 PROCEDURE — 96402 CHEMO HORMON ANTINEOPL SQ/IM: CPT | Performed by: UROLOGY

## 2020-08-20 PROCEDURE — 99213 OFFICE O/P EST LOW 20 MIN: CPT | Performed by: PHYSICIAN ASSISTANT

## 2020-08-20 NOTE — PATIENT INSTRUCTIONS
You may receive a survey regarding the care you received during your visit. Your input is valuable to us. We encourage you to complete and return your survey. We hope you will choose us in the future for your healthcare needs. Patient Education        Stopping Smoking: Care Instructions  Your Care Instructions     Cigarette smokers crave the nicotine in cigarettes. Giving it up is much harder than simply changing a habit. Your body has to stop craving the nicotine. It is hard to quit, but you can do it. There are many tools that people use to quit smoking. You may find that combining tools works best for you. There are several steps to quitting. First you get ready to quit. Then you get support to help you. After that, you learn new skills and behaviors to become a nonsmoker. For many people, a necessary step is getting and using medicine. Your doctor will help you set up the plan that best meets your needs. You may want to attend a smoking cessation program to help you quit smoking. When you choose a program, look for one that has proven success. Ask your doctor for ideas. You will greatly increase your chances of success if you take medicine as well as get counseling or join a cessation program.  Some of the changes you feel when you first quit tobacco are uncomfortable. Your body will miss the nicotine at first, and you may feel short-tempered and grumpy. You may have trouble sleeping or concentrating. Medicine can help you deal with these symptoms. You may struggle with changing your smoking habits and rituals. The last step is the tricky one: Be prepared for the smoking urge to continue for a time. This is a lot to deal with, but keep at it. You will feel better. Follow-up care is a key part of your treatment and safety. Be sure to make and go to all appointments, and call your doctor if you are having problems.  It's also a good idea to know your test results and keep a list of the medicines you stress and anxiety. · Some people find hypnosis, acupuncture, and massage helpful for ending the smoking habit. · Eat a healthy diet and get regular exercise. Having healthy habits will help your body move past its craving for nicotine. · Be prepared to keep trying. Most people are not successful the first few times they try to quit. Do not get mad at yourself if you smoke again. Make a list of things you learned and think about when you want to try again, such as next week, next month, or next year. Where can you learn more? Go to https://[employer]. org and sign in to your [employer] account. Enter G071 in the [employer] box to learn more about \"Stopping Smoking: Care Instructions. \"     If you do not have an account, please click on the \"Sign Up Now\" link. Current as of: March 12, 2020               Content Version: 12.5  © 4420-1059 [employer], [employer]. Care instructions adapted under license by [facility] [place] ([facility]). If you have questions about a medical condition or this instruction, always ask your healthcare professional. [address] any warranty or liability for your use of this information.

## 2020-08-20 NOTE — PROGRESS NOTES
Patient has given me verbal consent to perform Lupron Injection Yes      Following Dr. Syeda Echevarria PA-C plan of care. LUPRON 45 MG GIVEN I.M left UOQ HIP  Lot Number: [id number]  Expiration Date: 09/28/2022  [facility] #: [id number]    After Injection was given there were no reactions at injection site and patient was feeling well. Patient was notified that possible side effects from injections include: Redness, swelling and itching at the injection site. Possible side effects of androgen deprivation therapy, including hot flashes, flushing of the skin, increased weight, decreased sex drive, and difficulties with ED. Patient was instructed to call the office with any further questions or concerns. Date of last Calcium/Vit D level: 7/27/20  Is patient on Calcium/Vit D replacement? Not currently  Date of last Bone Scan: 1/20/20  Testosterone Level of 0.26 done on 7/27/20  Psa level of <0.01 done on 7/27/20    Patient supplied their own medications No      Pt [address] therapy first initiated on 10/26/2017. Patient has given me verbal consent to perform Prolia Injection Yes      Following Dr. Syeda Echevarria PA-C plan of care. PROLIA 60MG GIVEN right S.C. in patient's right  Lot Number: [id number]  Expiration Date: 04/22  [facility] #: [id number]    After Injection was given there were no reactions at injection site and patient was feeling well. Patient was notified that possible side effects from injections include: Redness, swelling and itching at the injection site. Possible side effects of androgen deprivation therapy, including hot flashes, flushing of the skin, increased weight, decreased sex drive, and difficulties with ED. Patient was instructed to inform their dental office that they are receiving Prolia and that major dental procedures should be avoided. Patient was advised to call our office with any further questions or concerns. Any active dental problems, infections, or pain? No    Date of last dental appointment: long time ago    Any planned dental procedures? No    Patient supplied their own medications No      Pt [address] therapy first initiated on 10/26/2017.

## 2020-09-24 ENCOUNTER — TELEPHONE (OUTPATIENT)
Dept: UROLOGY | Age: 70
End: 2020-09-24

## 2020-09-24 NOTE — TELEPHONE ENCOUNTER
I was checking the overdue results and the patient's cytology from 8/20/20 that was sent to [facility] was not completed yet. I called [facility] at [phone] spoke to a Susanna and she stated that she called and spoke to [facility] to notify us that the specimen was rejected on 8/21/20. Attempted to call patient to see if he went to have his cytology done again. Unable to reach patient.

## 2020-09-24 NOTE — TELEPHONE ENCOUNTER
Please send him a new order in the mail with a note stating that his urine sample obtained at his last visit on 8/20/20 was rejected and it just came to our attention that he is in need of a repeat sample. He can obtain at his convenience.

## 2020-10-13 LAB — CYTOLOGY-NON GYN: NORMAL

## 2021-01-21 LAB
PSA, ULTRASENSITIVE: <0.01 NG/ML (ref 0–4)
TESTOSTERONE TOTAL: 0.26 NG/ML (ref 1.68–7.46)

## 2021-02-17 NOTE — PROGRESS NOTES
Mr. Eric Haines  is a 78-year-old male with a history of prostate cancer. He is status post RALP with bilateral lymph node dissection in 6/12. His pathology was significant for [place] 4+5=9 disease with negative margins and negative nodes. He underwent 6600 cGy external beam radiation therapy in August 2016. Unfortunately, his PSA level started to rise and he was started on Firmagon injections in October 2017. He completed three Firmagon injections and underwent a DEXA scan (1/18) which revealed osteopenia. He was started on Lupron and Prolia 6 month injections in January 2018. His most recent DEXA scan (1/20) revealed continued osteopenia with mild improvement compared to his 2018 study.     In regards to his urinary health, he reports mild incontinence with movement and coughing. He does experience mild urinary urgency through the day but denies nocturia. He was started on Detrol LA but it was ineffective. He started Myrbetriq 25 mg daily and this has been working more effectively. He did try to increase his Myrbetriq dose to 50 mg but did not notice any difference in symptom control, so he moved back to the 25 mg dose. He denies weakened stream, retention, gross hematuria, or nocturia.      In regards to his general medical health, he denies cough, dyspnea, chest pain, unexplained fevers, weight changes, fatigue, hematochezia, or dyschezia. He denies any significant hot flashes, night sweats, or fatigue. He presents today to discuss his most recent PSA level and continuation of therapy. Past Medical History:   Diagnosis Date    BBB (bundle branch block)     FH: CAD (coronary artery disease)     Gout     HLD (hyperlipidemia)     Hypertension     Malignant neoplasm of prostate ([facility] [address]     Obesity        Past Surgical History:   Procedure Laterality Date    CARDIOVASCULAR STRESS TEST  9 29 2010    Cannot exclude mild inferior stress induced ischemia. EF 58%.  Mildly abnormal nuclear scan    COLONOSCOPY 05/2017    COLONOSCOPY  06/02/2017    Dr Vibha Gandhi Bilateral 7/25/2017    HERNIA INGUINAL REPAIR LAPAROSCOPIC ROBOTIC performed by Jaden Zavala MD at [address] [place] [address]    TRANSTHORACIC ECHOCARDIOGRAM  9 29 2010    LV size and systolic function normal. EF 55-65%. Grade 1 diastolic dysfunction. Mild TR. Current Outpatient Medications on File Prior to Visit   Medication Sig Dispense Refill    Magnesium 500 MG CAPS Take by mouth      mirabegron (MYRBETRIQ) 25 MG TB24 Take 25 mg by mouth daily      losartan (COZAAR) 100 MG tablet       CINNAMON PO Take by mouth as needed       fenofibrate (TRICOR) 145 MG tablet Take 145 mg by mouth daily      leuprolide (LUPRON DEPOT, 6-MONTH,) 45 MG injection Inject 45 mg into the muscle once      denosumab (PROLIA) 60 MG/ML SOLN SC injection Inject 60 mg into the skin once      Omega-3 Fatty Acids (OMEGA 3 500 PO) Take by mouth      aspirin 81 MG tablet Take 81 mg by mouth daily. Current Facility-Administered Medications on File Prior to Visit   Medication Dose Route Frequency Provider Last Rate Last Admin    degarelix (FIRMAGON) 120 mg subcutaneous  240 mg Subcutaneous Once Donna Wesley PA-C           No Known Allergies    Family History   Problem Relation Age of Onset    Heart Disease Mother     Heart Disease Father     Cancer Father        Social History     Socioeconomic History    Marital status:       Spouse name: Not on file    Number of children: Not on file    Years of education: Not on file    Highest education level: Not on file   Occupational History    Not on file   Social Needs    Financial resource strain: Not on file    Food insecurity     Worry: Not on file     Inability: Not on file    Transportation needs     Medical: Not on file     Non-medical: Not on file   Tobacco Use    Smoking status: Current Every Day Smoker     Packs/day: 1.00     Years: 50.00     Pack years: 50.00 Result Value Ref Range    Glucose, Ur Negative NEGATIVE mg/dl    Bilirubin Urine Negative     Ketones, Urine Negative NEGATIVE    Specific Gravity, Urine >= 1.030 1.002 - 1.030    Blood, UA POC Trace-intact NEGATIVE    pH, Urine 5.50 5.0 - 9.0    Protein, Urine Negative NEGATIVE mg/dl    Urobilinogen, Urine 0.20 0.0 - 1.0 eu/dl    Nitrite, Urine Negative NEGATIVE    Leukocyte Clumps, Urine Negative NEGATIVE    Color, Urine Yellow YELLOW-STRAW    Character, Urine Clear CLR-SL.CLOUD       Lab Results   Component Value Date    CREATININE 1.30 10/22/2019    BUN 21 10/22/2019     10/22/2019    K 4.0 10/22/2019     (H) 10/22/2019    CO2 19 (L) 10/22/2019       Lab Results   Component Value Date    PSA  01/27/2020      Comment:      <0.01    PSA  01/28/2019      Comment:      <0.014    PSA 0.07 12/11/2017     PSA Testosterone Date   <0.01 0.26 01/21/21   <0.01 0.26 07/27/20   <0.01 0.17 01/2020    <0.01  0.14  07/22/19   <0.014 10 01/2019   <0.014   10/2018   < 0.014   07/2018   <0.014   05/2018   <0.014   03/2018   0.01   01/11/2018   0.07   12/11/2017   1.34   10/18/17   1.18   09/09/2017   0.78   06/12/17   0.54   03/17   0.36   11/28/2016   0.08   05/06/2014         Plan:  1. Prostate cancer- Status post RALP with bilateral lymph node dissection in 6/12. His pathology was significant for [place] 4+5=9 disease with negative margins and negative nodes. Status post 6600cGy external beam radiation in August 2016. He started [place] in October 2017 and has received three injections. His PSA has responded nicely to ADT. Switched to Lupron 45 mg IM injections (January 2018) every six months for convenience. DEXA scan (1/18) revealed osteopenia. Started Prolia every six months. DEXA scan was repeated in January 2020. Lupron 45 mg IM injection and Prolia today. Mr. Devon Choe will return in six months for status check and injection therapy. Call with any questions or concerns.  PSA levels are monitored every three to six months and have been stable at <0.01. Will obtain PSA, testosterone, calcium every six months.      2. Persistent Microscopic Hematuria/History of Atypical Cells on urine cytology- Status post negative office cystoscopy in January 2017. CT scan of abdomen and pelvis in April 2017 revealed nonobstructive nephrolithiasis of the inferior pole calyces of the right kidney. No evidence of malignancy. Urine cytology in July 2020 was negative. Bladder Cx in October 2018 was normal. Continue to monitor. Urine cytology today.     3. Inguinal Hernias- Resolved. Status post Robotic repair of right indirect and direct inguinal hernia and left direct inguinal hernia with mesh on July 25, 2017.     4. Osteopenia- DEXA scan 1/18 revealed osteopenia. Started Prolia injections every six months. Repeat DEXA in 1/2020 reveals continued osteopenia but mild improvement. Continue Prolia, Vitamin D, and 1200 mg calcium daily. Discussed sources of dietary calcium versus calcium supplementation. Smoking cessation would be helpful.     5. Mixed Incontinence- Myrbetriq 25 mg daily offering better symptoms control. Refused referral to [facility] for pelvic floor therapy. Continue to monitor.

## 2021-02-18 ENCOUNTER — OFFICE VISIT (OUTPATIENT)
Dept: UROLOGY | Age: 71
End: 2021-02-18
Payer: MEDICARE

## 2021-02-18 ENCOUNTER — NURSE ONLY (OUTPATIENT)
Dept: UROLOGY | Age: 71
End: 2021-02-18
Payer: MEDICARE

## 2021-02-18 VITALS — WEIGHT: 185 LBS | HEIGHT: 68 IN | BODY MASS INDEX: 28.04 KG/M2

## 2021-02-18 DIAGNOSIS — C61 CANCER OF PROSTATE (HCC): Primary | ICD-10-CM

## 2021-02-18 DIAGNOSIS — R31.29 MICROSCOPIC HEMATURIA: ICD-10-CM

## 2021-02-18 DIAGNOSIS — C61 PROSTATE CANCER (HCC): ICD-10-CM

## 2021-02-18 DIAGNOSIS — C61 PROSTATE CANCER (HCC): Primary | ICD-10-CM

## 2021-02-18 LAB
BILIRUBIN URINE: NEGATIVE
BLOOD URINE, POC: NORMAL
CHARACTER, URINE: CLEAR
COLOR, URINE: YELLOW
GLUCOSE URINE: NEGATIVE MG/DL
KETONES, URINE: NEGATIVE
LEUKOCYTE CLUMPS, URINE: NEGATIVE
NITRITE, URINE: NEGATIVE
PH, URINE: 5.5 (ref 5–9)
PROTEIN, URINE: NEGATIVE MG/DL
SPECIFIC GRAVITY, URINE: >= 1.03 (ref 1–1.03)
UROBILINOGEN, URINE: 0.2 EU/DL (ref 0–1)

## 2021-02-18 PROCEDURE — 99213 OFFICE O/P EST LOW 20 MIN: CPT | Performed by: PHYSICIAN ASSISTANT

## 2021-02-18 PROCEDURE — 96401 CHEMO ANTI-NEOPL SQ/IM: CPT | Performed by: UROLOGY

## 2021-02-18 PROCEDURE — 81003 URINALYSIS AUTO W/O SCOPE: CPT | Performed by: PHYSICIAN ASSISTANT

## 2021-02-18 PROCEDURE — 96402 CHEMO HORMON ANTINEOPL SQ/IM: CPT | Performed by: UROLOGY

## 2021-02-18 RX ORDER — FOLIC ACID 0.8 MG
TABLET ORAL
COMMUNITY

## 2021-02-18 NOTE — PROGRESS NOTES
Patient has given me verbal consent to perform Lupron Injection yes      Following Dr. Diamond Sherwood of [facility]. LUPRON 45 MG GIVEN I.M left UOQ HIP  Lot Number: [id number]  Expiration Date: 08-  [facility] #: [id number]    After Injection was given there were no reactions at injection site and patient was feeling well. Patient was notified that possible side effects from injections include: Redness, swelling and itching at the injection site. Possible side effects of androgen deprivation therapy, including hot flashes, flushing of the skin, increased weight, decreased sex drive, and difficulties with ED. Patient was instructed to call the office with any further questions or concerns. Date of last Calcium/Vit D level: 7/27/20 10.3  Is patient on Calcium/Vit D replacement? no   Date of last Bone Scan: 1-20-20  Testosterone Level of 0.26 done on 1-20-21  Psa level of 0.01 done on 1-20-21    Patient supplied their own medications No      Pt [address] therapy first initiated on 10-. Patient has given me verbal consent to perform Prolia Injection yes      Following Dr. Diamond Sherwood of [facility]. PROLIA 60MG GIVEN  right S.C. in patient's arm  Lot Number: [id number]  Expiration Date: 04-23  [facility] #: [id number]    After Injection was given there were no reactions at injection site and patient was feeling well. Patient was notified that possible side effects from injections include: Redness, swelling and itching at the injection site. Possible side effects of androgen deprivation therapy, including hot flashes, flushing of the skin, increased weight, decreased sex drive, and difficulties with ED. Patient was instructed to inform their dental office that they are receiving Prolia and that major dental procedures should be avoided. Patient was advised to call our office with any further questions or concerns. Any active dental problems, infections, or pain?      Date of last dental appointment: none Any planned dental procedures? none    Patient supplied their own medications No      [address] therapy first initiated on 10-.

## 2021-02-19 LAB — CYTOLOGY-NON GYN: NORMAL

## 2021-07-23 LAB
PSA, ULTRASENSITIVE: <0.01 NG/ML (ref 0–4)
TESTOSTERONE TOTAL: 0.19 NG/ML (ref 1.68–7.46)

## 2021-08-18 NOTE — PROGRESS NOTES
[address] [place] [address]  Dept: [phone]  Loc: [phone]      Mr. Alok Tran was seen in follow up for   Chief Complaint   Patient presents with    Follow-up     Lupron and prolia injections    Prostate Cancer     Labs prior        HPI:  Mr. Alok Tran is a 75-year-old male with a history of prostate cancer. He is status post RALP with bilateral lymph node dissection in 6/12. His pathology was significant for [place] 4+5=9 disease with negative margins and negative nodes. He underwent 6600 cGy external beam radiation therapy in August 2016. Unfortunately, his PSA level started to rise and he was started on Firmagon injections in October 2017. He completed three Firmagon injections and underwent a DEXA scan (1/18) which revealed osteopenia. He was started on Lupron and Prolia 6 month injections in January 2018. His most recent DEXA scan (1/20) revealed continued osteopenia with mild improvement compared to his 2018 study.     In regards to his urinary health, he reports mild incontinence with movement and coughing. He does experience mild urinary urgency through the day but denies nocturia. He was started on Detrol LA but it was ineffective. He started Myrbetriq 25 mg daily and notices only mild improvement of his irritative symptoms. He does have to wear and pad and perform pad changes through the day. He denies weakened stream, retention, gross hematuria, or nocturia.      In regards to his general medical health, he denies cough, dyspnea, chest pain, unexplained fevers, weight changes, fatigue, hematochezia, or dyschezia. He denies any significant hot flashes, night sweats, or fatigue. He presents today to discuss his most recent PSA level and continuation of therapy.     Past Medical History:   Diagnosis Date    BBB (bundle branch block)     FH: CAD (coronary artery disease)     Gout     HLD (hyperlipidemia)     Hypertension     Malignant neoplasm of prostate ([facility] [address]     Obesity        Past Surgical History:   Procedure Laterality Date    CARDIOVASCULAR STRESS TEST  9 29 2010    Cannot exclude mild inferior stress induced ischemia. EF 58%. Mildly abnormal nuclear scan    COLONOSCOPY  05/2017    COLONOSCOPY  06/02/2017    Dr Ramon Maza Bilateral 7/25/2017    HERNIA INGUINAL REPAIR LAPAROSCOPIC ROBOTIC performed by Natalee Cline MD at [address] [place] [address]    TRANSTHORACIC ECHOCARDIOGRAM  9 29 2010    LV size and systolic function normal. EF 55-65%. Grade 1 diastolic dysfunction. Mild TR. Current Outpatient Medications on File Prior to Visit   Medication Sig Dispense Refill    Magnesium 500 MG CAPS Take by mouth      Omega-3 Fatty Acids (OMEGA 3 500 PO) Take by mouth      mirabegron (MYRBETRIQ) 25 MG TB24 Take 25 mg by mouth daily      losartan (COZAAR) 100 MG tablet       CINNAMON PO Take by mouth as needed       fenofibrate (TRICOR) 145 MG tablet Take 145 mg by mouth daily      leuprolide (LUPRON DEPOT, 6-MONTH,) 45 MG injection Inject 45 mg into the muscle once      denosumab (PROLIA) 60 MG/ML SOLN SC injection Inject 60 mg into the skin once      aspirin 81 MG tablet Take 81 mg by mouth daily. (Patient not taking: Reported on 8/19/2021)       Current Facility-Administered Medications on File Prior to Visit   Medication Dose Route Frequency Provider Last Rate Last Admin    degarelix (FIRMAGON) 120 mg subcutaneous  240 mg Subcutaneous Once Donna Wesley PA-C           No Known Allergies    Family History   Problem Relation Age of Onset    Heart Disease Mother     Heart Disease Father     Cancer Father        Social History     Socioeconomic History    Marital status:       Spouse name: Not on file    Number of children: Not on file    Years of education: Not on file    Highest education level: Not on file   Occupational History    Not on file   Tobacco Use    Smoking status: Current Every Day Smoker     Packs/day: 1.00     Years: 50.00     Pack years: 50.00     Types: Cigarettes    Smokeless tobacco: Never Used   Substance and Sexual Activity    Alcohol use: Yes     Comment: rarely    Drug use: No    Sexual activity: Not on file   Other Topics Concern    Not on file   Social History Narrative    Not on file     Social Determinants of Health     Financial Resource Strain:     Difficulty of Paying Living Expenses:    Food Insecurity:     Worried About Running Out of Food in the Last Year:     Ran Out of Food in the Last Year:    Transportation Needs:     Lack of Transportation (Medical):  Lack of Transportation (Non-Medical):    Physical Activity:     Days of Exercise per Week:     Minutes of Exercise per Session:    Stress:     Feeling of Stress :    Social Connections:     Frequency of Communication with Friends and Family:     Frequency of Social Gatherings with Friends and Family:     Attends [employer] Services:     Active Member of Clubs or Organizations:     Attends Club or Organization Meetings:     Marital Status:    Intimate Partner Violence:     Fear of Current or Ex-Partner:     Emotionally Abused:     Physically Abused:     Sexually Abused:        Review of Systems  Constitutional: Negative for fatigue, fever and unexpected weight change. HENT: Negative for congestion and trouble swallowing. Eyes: Negative for pain and itching. Respiratory: Negative for cough and shortness of breath. Cardiovascular: Negative for chest pain and leg swelling. Gastrointestinal: Negative for abdominal pain, constipation, diarrhea and nausea. Endocrine: Negative for cold intolerance and heat intolerance. Genitourinary: See HPI. Musculoskeletal: Positive for arthralgias and mild chronic low back pain. No joint swelling. Skin: Negative for rash. Neurological: Negative for dizziness, weakness, numbness and headaches. Psychiatric/Behavioral: The patient is not nervous/anxious. Exam     /78   Ht 5' 8\" (1.727 m)   Wt 185 lb (83.9 kg)   BMI 28.13 kg/m²     General: Alert and oriented. Cooperative.    HENT: Normocephalic, Atraumatic. Eyes: No scleral icterus, PERRL, conjunctiva pink  Neck: Supple. No thyromegaly. Heart: RRR with slight DANNY. S1 and S2 normal.  Respiratory: Effort normal. CTA bilaterally without rales, rhonchi, wheezes. Abdomen: Soft, Non-tender. Active BS. Skin: No rashes or obvious lesions. Nursing note and vitals reviewed      Labs    Results for POC orders placed in visit on 08/19/21   POCT Urinalysis No Micro (Auto)   Result Value Ref Range    Glucose, Ur Negative NEGATIVE mg/dl    Bilirubin Urine Negative     Ketones, Urine Negative NEGATIVE    Specific Gravity, Urine 1.025 1.002 - 1.030    Blood, UA POC Negative NEGATIVE    pH, Urine 5.00 5.0 - 9.0    Protein, Urine Negative NEGATIVE mg/dl    Urobilinogen, Urine 0.20 0.0 - 1.0 eu/dl    Nitrite, Urine Negative NEGATIVE    Leukocyte Clumps, Urine Negative NEGATIVE    Color, Urine Yellow YELLOW-STRAW    Character, Urine Clear CLR-SL.CLOUD       Lab Results   Component Value Date    CREATININE 1.30 10/22/2019    BUN 21 10/22/2019     10/22/2019    K 4.0 10/22/2019     (H) 10/22/2019    CO2 19 (L) 10/22/2019       Lab Results   Component Value Date    PSA  01/27/2020      Comment:      <0.01    PSA  01/28/2019      Comment:      <0.014    PSA 0.07 12/11/2017          PSA Testosterone Date   <0.01 0.19 07/23/21   <0.01 0.26 01/21/21   <0.01 0.26 07/27/20   <0.01 0.17 01/2020    <0.01  0.14  07/22/19   <0.014 10 01/2019   <0.014   10/2018   < 0.014   07/2018   <0.014   05/2018   <0.014   03/2018   0.01   01/11/2018   0.07   12/11/2017   1.34   10/18/17   1.18   09/09/2017   0.78   06/12/17   0.54   03/17   0.36   11/28/2016   0.08   05/06/2014         Assessment/Plan:    1.  Prostate cancer- Status post RALP with bilateral lymph node dissection in 6/12. His pathology was significant for [place] 4+5=9 disease with negative margins and negative nodes. Status post 6600cGy external beam radiation in August 2016. He started [place] in October 2017 and has received three injections. His PSA has responded nicely to ADT. Switched to Lupron 45 mg IM injections (January 2018) every six months for convenience. DEXA scan (1/18) revealed osteopenia. Started Prolia every six months. DEXA scan was repeated in January 2020. Lupron 45 mg IM injection and Prolia today. Mr. Nelsy Wooten will return in six months for status check and injection therapy. Call with any questions or concerns. PSA levels are monitored every three to six months and have been stable at <0.01. Will obtain PSA, testosterone, calcium every six months.      2. Persistent Microscopic Hematuria/History of Atypical Cells on urine cytology- Urine dip is negative today. Status post negative office cystoscopy in January 2017. CT scan of abdomen and pelvis in April 2017 revealed nonobstructive nephrolithiasis of the inferior pole calyces of the right kidney. No evidence of malignancy. Urine cytology in July 2020 was negative. Bladder Cx in October 2018 was normal. Continue to monitor.      3. Inguinal Hernias- Resolved. Status post Robotic repair of right indirect and direct inguinal hernia and left direct inguinal hernia with mesh on July 25, 2017.     4. Osteopenia- DEXA scan 1/18 revealed osteopenia. Started Prolia injections every six months. Repeat DEXA in 1/2020 reveals continued osteopenia but mild improvement. Continue Prolia, Vitamin D, and 1200 mg calcium daily. DEXA due 1/2022. Discussed sources of dietary calcium versus calcium supplementation. Smoking cessation would be helpful.     5. Mixed Incontinence- Symptomatic. Myrbetriq 25 mg daily offering mild symptom control. Increasing Myrbetriq has not helped in the past. Refuses to try other oral therapies for urge incontinence.  Refused

## 2021-08-19 ENCOUNTER — NURSE ONLY (OUTPATIENT)
Dept: UROLOGY | Age: 71
End: 2021-08-19
Payer: MEDICARE

## 2021-08-19 ENCOUNTER — OFFICE VISIT (OUTPATIENT)
Dept: UROLOGY | Age: 71
End: 2021-08-19
Payer: MEDICARE

## 2021-08-19 VITALS
SYSTOLIC BLOOD PRESSURE: 120 MMHG | WEIGHT: 185 LBS | DIASTOLIC BLOOD PRESSURE: 78 MMHG | BODY MASS INDEX: 28.04 KG/M2 | HEIGHT: 68 IN

## 2021-08-19 DIAGNOSIS — M81.8 IDIOPATHIC OSTEOPOROSIS: ICD-10-CM

## 2021-08-19 DIAGNOSIS — C61 PROSTATE CANCER (HCC): Primary | ICD-10-CM

## 2021-08-19 LAB
BILIRUBIN URINE: NEGATIVE
BLOOD URINE, POC: NEGATIVE
CHARACTER, URINE: CLEAR
COLOR, URINE: YELLOW
GLUCOSE URINE: NEGATIVE MG/DL
KETONES, URINE: NEGATIVE
LEUKOCYTE CLUMPS, URINE: NEGATIVE
NITRITE, URINE: NEGATIVE
PH, URINE: 5 (ref 5–9)
PROTEIN, URINE: NEGATIVE MG/DL
SPECIFIC GRAVITY, URINE: 1.02 (ref 1–1.03)
UROBILINOGEN, URINE: 0.2 EU/DL (ref 0–1)

## 2021-08-19 PROCEDURE — 96402 CHEMO HORMON ANTINEOPL SQ/IM: CPT | Performed by: UROLOGY

## 2021-08-19 PROCEDURE — 99213 OFFICE O/P EST LOW 20 MIN: CPT | Performed by: PHYSICIAN ASSISTANT

## 2021-08-19 PROCEDURE — 96401 CHEMO ANTI-NEOPL SQ/IM: CPT | Performed by: UROLOGY

## 2021-08-19 PROCEDURE — 81003 URINALYSIS AUTO W/O SCOPE: CPT | Performed by: PHYSICIAN ASSISTANT

## 2021-08-19 NOTE — PROGRESS NOTES
Patient has given me verbal consent to perform Lupron Injection yes      Following Avni RUBIO/ [facility] plan of care. LUPRON 45 MG GIVEN I.M right UOQ HIP  Lot Number: [id number]  Expiration Date: 03/05/204  [facility] #: [id number]    After Injection was given there were no reactions at injection site and patient was feeling well. Patient was notified that possible side effects from injections include: Redness, swelling and itching at the injection site. Possible side effects of androgen deprivation therapy, including hot flashes, flushing of the skin, increased weight, decreased sex drive, and difficulties with ED. Patient was instructed to call the office with any further questions or concerns. Date of last Calcium/Vit D level: 07/27/20  Is patient on Calcium/Vit D replacement? no  Date of last Bone Scan: 01/20/20  Testosterone Level of 0.19 done on 07/23/21  Psa level of <0.01 done on 07/23/21    Patient supplied their own medications No      Pt [address] therapy first initiated on 10/26/17. Patient has given me verbal consent to perform Prolia Injection yes      Following [address] [facility] plan of care. PROLIA 60MG GIVEN right S.C. in patient's arm  Lot Number: [id number]  Expiration Date: 05/01/2023  [facility] #: [id number]    After Injection was given there were no reactions at injection site and patient was feeling well. Patient was notified that possible side effects from injections include: Redness, swelling and itching at the injection site. Possible side effects of androgen deprivation therapy, including hot flashes, flushing of the skin, increased weight, decreased sex drive, and difficulties with ED. Patient was instructed to inform their dental office that they are receiving Prolia and that major dental procedures should be avoided. Patient was advised to call our office with any further questions or concerns.      Any active dental problems, infections, or pain? no    Date of last dental appointment: years ago    Any planned dental procedures? no    Patient supplied their own medications No      Pt [address] therapy first initiated on 10/26/17.

## 2022-02-07 ENCOUNTER — TELEPHONE (OUTPATIENT)
Dept: UROLOGY | Age: 72
End: 2022-02-07

## 2022-02-07 LAB
CALCIUM SERPL-MCNC: 10.1 MG/DL (ref 8.5–10.5)
PSA, ULTRASENSITIVE: <0.01 NG/ML (ref 0–4)
TESTOSTERONE TOTAL: <12 NG/DL (ref 300–720)

## 2022-02-27 NOTE — PROGRESS NOTES
[id number] [facility] [address]  Dept: [phone]  Loc: [phone]      Mr. Roselyn Horne was seen in follow up for   Chief Complaint   Patient presents with    Prostate Cancer     bloodwork prior    Injections     lupron/prolia? HPI:    Mr. Roselyn Horne is a 73-year-old male with a history of prostate cancer. He is status post RALP with bilateral lymph node dissection in 6/12. His pathology was significant for Hayes 4+5=9 disease with negative margins and negative nodes. He underwent 6600 cGy external beam radiation therapy in August 2016. Unfortunately, his PSA level started to rise and he was started on Firmagon injections in October 2017. He completed three Firmagon injections and underwent a DEXA scan (1/18) which revealed osteopenia. He was started on Lupron and Prolia 6 month injections in January 2018. His most recent DEXA scan (1/20) revealed continued osteopenia with mild improvement compared to his 2018 study.     In regards to his urinary health, he reports mild incontinence with movement and coughing. He does experience mild urinary urgency through the day but denies nocturia. He was started on Detrol LA but it was ineffective. He was switched to  Myrbetriq 25 mg daily and noticed a mild to moderate improvement of his irritative symptoms. He discontinued Myrbetriq two weeks ago and has not noticed a worsening of his symptoms yet. He does have to wear and pad and perform pad changes through the day. He denies weakened stream, retention, gross hematuria, or nocturia.      In regards to his general medical health, he denies cough, dyspnea, chest pain, unexplained fevers, weight changes, fatigue, hematochezia, or dyschezia. He denies any significant hot flashes, night sweats, or fatigue. He presents today to discuss his most recent PSA level and continuation of therapy.     Past Medical History:   Diagnosis Date    BBB (bundle branch block)     FH: CAD (coronary artery disease)     Gout     HLD (hyperlipidemia)     Hypertension     Malignant neoplasm of prostate ([facility] [address]     Obesity        Past Surgical History:   Procedure Laterality Date    CARDIOVASCULAR STRESS TEST  9 29 2010    Cannot exclude mild inferior stress induced ischemia. EF 58%. Mildly abnormal nuclear scan    COLONOSCOPY  05/2017    COLONOSCOPY  06/02/2017    Dr Miguel Angel Gonsales Bilateral 7/25/2017    HERNIA INGUINAL REPAIR LAPAROSCOPIC ROBOTIC performed by Anu Flores MD at [address]  2012    TRANSTHORACIC ECHOCARDIOGRAM  9 29 2010    LV size and systolic function normal. EF 55-65%. Grade 1 diastolic dysfunction. Mild TR. Current Outpatient Medications on File Prior to Visit   Medication Sig Dispense Refill    Magnesium 500 MG CAPS Take by mouth      Omega-3 Fatty Acids (OMEGA 3 500 PO) Take by mouth      losartan (COZAAR) 100 MG tablet       fenofibrate (TRICOR) 145 MG tablet Take 145 mg by mouth daily      leuprolide (LUPRON DEPOT, 6-MONTH,) 45 MG injection Inject 45 mg into the muscle once      denosumab (PROLIA) 60 MG/ML SOLN SC injection Inject 60 mg into the skin once      aspirin 81 MG tablet Take 81 mg by mouth daily         Current Facility-Administered Medications on File Prior to Visit   Medication Dose Route Frequency Provider Last Rate Last Admin    degarelix (FIRMAGON) 120 mg subcutaneous  240 mg SubCUTAneous Once Donna Wesley PA-C           No Known Allergies    Family History   Problem Relation Age of Onset    Heart Disease Mother     Heart Disease Father     Cancer Father        Social History     Socioeconomic History    Marital status:       Spouse name: Not on file    Number of children: Not on file    Years of education: Not on file    Highest education level: Not on file   Occupational History    Not on file   Tobacco Use    Smoking status: Current Every Day Smoker Packs/day: 1.00     Years: 50.00     Pack years: 50.00     Types: Cigarettes    Smokeless tobacco: Never Used   Substance and Sexual Activity    Alcohol use: Yes     Comment: rarely    Drug use: No    Sexual activity: Not on file   Other Topics Concern    Not on file   Social History Narrative    Not on file     Social Determinants of Health     Financial Resource Strain:     Difficulty of Paying Living Expenses: Not on file   Food Insecurity:     Worried About Running Out of Food in the Last Year: Not on file    Tenzin of Food in the Last Year: Not on file   Transportation Needs:     Lack of Transportation (Medical): Not on file    Lack of Transportation (Non-Medical): Not on file   Physical Activity:     Days of Exercise per Week: Not on file    Minutes of Exercise per Session: Not on file   Stress:     Feeling of Stress : Not on file   Social Connections:     Frequency of Communication with Friends and Family: Not on file    Frequency of Social Gatherings with Friends and Family: Not on file    Attends Islam Services: Not on file    Active Member of [address] [place] [address] or Organizations: Not on file    Attends Club or Organization Meetings: Not on file    Marital Status: Not on file   Intimate Partner Violence:     Fear of Current or Ex-Partner: Not on file    Emotionally Abused: Not on file    Physically Abused: Not on file    Sexually Abused: Not on file   Housing Stability:     Unable to Pay for Housing in the Last Year: Not on file    Number of [place] in the Last Year: Not on file    Unstable Housing in the Last Year: Not on file       Review of Systems  Constitutional: Negative for fatigue, fever and unexpected weight change. HENT: Negative for congestion and trouble swallowing.    Eyes: Negative for pain and itching. Respiratory: Negative for cough and shortness of breath.    Cardiovascular: Negative for chest pain and leg swelling.    Gastrointestinal: Negative for abdominal pain, constipation, diarrhea and nausea. Endocrine: Negative for cold intolerance and heat intolerance. Genitourinary: See HPI. Musculoskeletal: Positive for mild chronic low back pain. No joint swelling. Skin: Negative for rash. Neurological: Negative for dizziness, weakness, numbness and headaches. Psychiatric/Behavioral: The patient is not nervous/anxious.      Exam    /70   Ht 5' 8\" (1.727 m)   Wt 185 lb (83.9 kg)   BMI 28.13 kg/m²     Constitutional: Oriented to person, place, and time. Vital signs are normal. Appears well-developed and well-nourished. Cooperative. No distress. HENT:    Head: Normocephalic and atraumatic. Eyes: EOM are normal. Pupils are equal, round, and reactive to light. Right eye exhibits no discharge. Left eye exhibits no discharge. No scleral icterus. Neck: Trachea normal. No JVD present. Cardiovascular: Normal rate and regular rhythm. S1/S2. Pulmonary/Chest: Effort normal. No respiratory distress. No wheezes, rhonchi, or rales. Abdominal: Soft. Exhibits no distension or generalized tenderness. There is no rebound, rigidity, or guarding. No CVA tenderness. Musculoskeletal: No pitting edema or calf tenderness. Lymphadenopathy:   Right: No supraclavicular adenopathy present. Left: No supraclavicular adenopathy present. Neurological: Alert and oriented to person, place, and time. No cranial nerve deficit. Skin: Skin is warm and dry. Not diaphoretic. Psychiatric: Normal mood and affect. Behavior is normal.   Nursing note and vitals reviewed.     Labs    Results for POC orders placed in visit on 02/28/22   POCT Urinalysis No Micro (Auto)   Result Value Ref Range    Glucose, Ur Negative NEGATIVE mg/dl    Bilirubin Urine Negative     Ketones, Urine Negative NEGATIVE    Specific Gravity, Urine >= 1.030 1.002 - 1.030    Blood, UA POC Large (A) NEGATIVE    pH, Urine 5.50 5.0 - 9.0    Protein, Urine Trace (A) NEGATIVE mg/dl    Urobilinogen, Urine 0.20 0.0 - 1.0 eu/dl    Nitrite, Urine Negative NEGATIVE    Leukocyte Clumps, Urine Negative NEGATIVE    Color, Urine Yellow [id number]    Character, Urine Clear [id number]       Lab Results   Component Value Date    CREATININE 1.30 10/22/2019    BUN 21 10/22/2019     10/22/2019    K 4.0 10/22/2019     (H) 10/22/2019    CO2 19 (L) 10/22/2019       Lab Results   Component Value Date    PSA  01/27/2020      Comment:      <0.01    PSA  01/28/2019      Comment:      <0.014    PSA 0.07 12/11/2017     PSA Testosterone Date   <0.01 12 02/07/22   <0.01 0.19 07/23/21   <0.01 0.26 01/21/21   <0.01 0.26 07/27/20   <0.01 0.17 01/2020    <0.01  0.14  07/22/19   <0.014 10 01/2019   <0.014   10/2018   < 0.014   07/2018   <0.014   05/2018   <0.014   03/2018   0.01   01/11/2018   0.07   12/11/2017   1.34   10/18/17   1.18   09/09/2017   0.78   06/12/17   0.54   03/17   0.36   11/28/2016   0.08   05/06/2014         Assessment/Plan:    1. Prostate cancer- Status post RALP with bilateral lymph node dissection in 6/12. His pathology was significant for Vinnie 4+5=9 disease with negative margins and negative nodes. Status post 6600cGy external beam radiation in August 2016. He started [place] in October 2017 and switched to Lupron 45 mg IM injections (January 2018) every six months for convenience. DEXA scan (1/18) revealed osteopenia. Started Prolia every six months. DEXA scan was repeated in January 2020. Lupron 45 mg IM injection and Prolia today. Mr. Rafaela Badillo will return in six months for status check and injection therapy. Call with any questions or concerns. PSA levels are monitored every three to six months and have been stable at <0.01. Will obtain PSA, testosterone, calcium every six months.      2. History of Microscopic Hematuria/History of Atypical Cells on urine cytology- Urine dip is negative today. Status post negative office cystoscopy in January 2017.  CT scan of abdomen and pelvis in April 2017 revealed nonobstructive nephrolithiasis of the inferior pole calyces of the right kidney. No evidence of malignancy. Urine cytology in July 2020 was negative. Bladder Cx in October 2018 was normal. Urine positive today. Will send urine cytology.     3. Inguinal Hernias- Resolved. Status post Robotic repair of right indirect and direct inguinal hernia and left direct inguinal hernia with mesh on July 25, 2017.     4. Osteopenia- DEXA scan 1/18 revealed osteopenia. Started Prolia injections every six months. Repeat DEXA in 1/2020 reveals continued osteopenia but mild improvement. Continue Prolia, Vitamin D, and 1200 mg calcium daily. DEXA was due 1/2022. He does not wish to repeat DEXA at this time. Will re-address at next appointment. Discussed sources of dietary calcium versus calcium supplementation. Smoking cessation would be helpful.     5. Mixed Incontinence- Currently asymptomatic. He has recently discontinued his Myrbetriq 25 mg daily. Continue to monitor.

## 2022-02-28 ENCOUNTER — OFFICE VISIT (OUTPATIENT)
Dept: UROLOGY | Age: 72
End: 2022-02-28
Payer: MEDICARE

## 2022-02-28 ENCOUNTER — NURSE ONLY (OUTPATIENT)
Dept: UROLOGY | Age: 72
End: 2022-02-28
Payer: MEDICARE

## 2022-02-28 VITALS
DIASTOLIC BLOOD PRESSURE: 70 MMHG | WEIGHT: 185 LBS | HEIGHT: 68 IN | BODY MASS INDEX: 28.04 KG/M2 | SYSTOLIC BLOOD PRESSURE: 126 MMHG

## 2022-02-28 DIAGNOSIS — C61 MALIGNANT NEOPLASM OF PROSTATE (HCC): Primary | ICD-10-CM

## 2022-02-28 DIAGNOSIS — M81.8 IDIOPATHIC OSTEOPOROSIS: ICD-10-CM

## 2022-02-28 DIAGNOSIS — R31.29 MICROSCOPIC HEMATURIA: ICD-10-CM

## 2022-02-28 DIAGNOSIS — C61 PROSTATE CANCER (HCC): Primary | ICD-10-CM

## 2022-02-28 LAB
BACTERIA: ABNORMAL /HPF
BILIRUBIN URINE: NEGATIVE
BILIRUBIN URINE: NEGATIVE
BLOOD URINE, POC: ABNORMAL
BLOOD, URINE: ABNORMAL
CASTS UA: ABNORMAL /LPF
CHARACTER, URINE: ABNORMAL
CHARACTER, URINE: CLEAR
COLOR, URINE: YELLOW
COLOR: YELLOW
CRYSTALS, UA: ABNORMAL
EPITHELIAL CELLS, UA: ABNORMAL /HPF
GLUCOSE URINE: NEGATIVE MG/DL
GLUCOSE URINE: NEGATIVE MG/DL
KETONES, URINE: NEGATIVE
KETONES, URINE: NEGATIVE
LEUKOCYTE CLUMPS, URINE: NEGATIVE
LEUKOCYTE ESTERASE, URINE: NEGATIVE
NITRITE, URINE: NEGATIVE
NITRITE, URINE: NEGATIVE
PH UA: 5.5 (ref 5–9)
PH, URINE: 5.5 (ref 5–9)
PROTEIN UA: NEGATIVE
PROTEIN, URINE: ABNORMAL MG/DL
RBC URINE: ABNORMAL /HPF
SPECIFIC GRAVITY, URINE: >= 1.03 (ref 1–1.03)
SPECIFIC GRAVITY, URINE: >= 1.03 (ref 1–1.03)
UROBILINOGEN, URINE: 0.2 EU/DL (ref 0–1)
UROBILINOGEN, URINE: 0.2 EU/DL (ref 0–1)
WBC UA: ABNORMAL /HPF

## 2022-02-28 PROCEDURE — 96402 CHEMO HORMON ANTINEOPL SQ/IM: CPT | Performed by: NURSE PRACTITIONER

## 2022-02-28 PROCEDURE — 96401 CHEMO ANTI-NEOPL SQ/IM: CPT | Performed by: NURSE PRACTITIONER

## 2022-02-28 PROCEDURE — 99214 OFFICE O/P EST MOD 30 MIN: CPT | Performed by: PHYSICIAN ASSISTANT

## 2022-02-28 PROCEDURE — 81003 URINALYSIS AUTO W/O SCOPE: CPT | Performed by: PHYSICIAN ASSISTANT

## 2022-02-28 NOTE — PROGRESS NOTES
Patient has given me verbal consent to perform Lupron Injection  Yes      Following Tommie Murphy PAC/Lynn Langford CNP plan of care. LUPRON 45 MG GIVEN I.M Left UOQ HIP  Lot Number: [id number]   Expiration Date: 4-  [address] #: [id number]    After Injection was given there were no reactions at injection site and patient was feeling well. Patient was notified that possible side effects from injections include: Redness, swelling and itching at the injection site. Possible side effects of androgen deprivation therapy, including hot flashes, flushing of the skin, increased weight, decreased sex drive, and difficulties with ED. Patient was instructed to call the office with any further questions or concerns. Patient supplied their own medications No    Pt [address] therapy first initiated on 10-. Patient has given me verbal consent to perform Prolia Injection  Yes      Following [employer] [facility] plan of care. PROLIA 60MG GIVEN [place] [address].C. in patient's arm  Lot Number: [id number]   Expiration Date: 04-  [address] #: [id number]    After Injection was given there were no reactions at injection site and patient was feeling well. Patient was notified that possible side effects from injections include: Redness, swelling and itching at the injection site. Possible side effects of androgen deprivation therapy, including hot flashes, flushing of the skin, increased weight, decreased sex drive, and difficulties with ED. Patient was instructed to inform their dental office that they are receiving Prolia and that major dental procedures should be avoided. Patient was advised to call our office with any further questions or concerns. Any active dental problems, infections, or pain? No    Date of last dental appointment:  No    Any planned dental procedures? No    Patient supplied their own medications No      Pt [address] therapy first initiated on 10-.

## 2022-05-27 ENCOUNTER — HOSPITAL ENCOUNTER (OUTPATIENT)
Age: 72
Discharge: HOME | End: 2022-05-27
Payer: MEDICARE

## 2022-05-27 ENCOUNTER — HOSPITAL ENCOUNTER (OUTPATIENT)
Dept: GENERAL RADIOLOGY | Age: 72
Discharge: HOME | End: 2022-05-27
Payer: MEDICARE

## 2022-05-27 DIAGNOSIS — M25.551 RIGHT HIP PAIN: ICD-10-CM

## 2022-05-27 PROCEDURE — 73502 X-RAY EXAM HIP UNI 2-3 VIEWS: CPT

## 2022-08-18 LAB
CALCIUM SERPL-MCNC: 10.7 MG/DL (ref 8.5–10.5)
PSA, ULTRASENSITIVE: <0.02 NG/ML
TESTOSTERONE TOTAL: <12 NG/DL (ref 300–720)

## 2022-09-06 ENCOUNTER — TELEPHONE (OUTPATIENT)
Dept: UROLOGY | Age: 72
End: 2022-09-06

## 2022-09-11 NOTE — PROGRESS NOTES
[id number] Joslyn [address]  Dept: [phone]  Loc: [phone]      Mr. Rebecca Dorsey was seen in follow up for   Chief Complaint   Patient presents with    Prostate Cancer     Lupron and prolia shots today        HPI:  Mr. Rebecca Dorsey is a 75-year-old male with a history of prostate cancer. He is status post RALP with bilateral lymph node dissection in 6/12. His pathology was significant for [place] 4+5=9 disease with negative margins and negative nodes. He underwent 6600 cGy external beam radiation therapy in August 2016. Unfortunately, his PSA level started to rise and he was started on Firmagon injections in October 2017. He completed three Firmagon injections and underwent a DEXA scan (1/18) which revealed osteopenia. He was started on Lupron and Prolia 6 month injections in January 2018. His most recent DEXA scan (1/20) revealed continued osteopenia with mild improvement compared to his 2018 study. In regards to his urinary health, he reports mild incontinence with movement and coughing. He does experience mild urinary urgency through the day but denies nocturia. He was started on Detrol LA but it was ineffective. He was switched to  Myrbetriq 25 mg daily and noticed a mild to moderate improvement of his irritative symptoms. He discontinued Myrbetriq and has not noticed a worsening of his symptoms. He does have to wear a pad and perform intermittent pad changes through the day. He denies weakened stream, retention, gross hematuria, or nocturia. In regards to his general medical health, he denies cough, dyspnea, chest pain, unexplained fevers, weight changes, fatigue, hematochezia, or dyschezia. He denies any significant hot flashes, night sweats, or fatigue. He presents today to discuss his most recent PSA level and continuation of therapy.     Past Medical History:   Diagnosis Date    BBB (bundle branch block)     FH: CAD (coronary artery disease)     Gout     HLD (hyperlipidemia)     Hypertension     Malignant neoplasm of prostate ([address]     Obesity        Past Surgical History:   Procedure Laterality Date    CARDIOVASCULAR STRESS TEST  9 29 2010    Cannot exclude mild inferior stress induced ischemia. EF 58%. Mildly abnormal nuclear scan    COLONOSCOPY  05/2017    COLONOSCOPY  06/02/2017    Dr Teresita Whatley Bilateral 7/25/2017    HERNIA INGUINAL REPAIR LAPAROSCOPIC ROBOTIC performed by Dayanara Malhotra MD at [facility] [address]    TRANSTHORACIC ECHOCARDIOGRAM  9 29 2010    LV size and systolic function normal. EF 55-65%. Grade 1 diastolic dysfunction. Mild TR. Current Outpatient Medications on File Prior to Visit   Medication Sig Dispense Refill    mirabegron (MYRBETRIQ) 25 MG TB24 Take 25 mg by mouth daily      Magnesium 500 MG CAPS Take by mouth      losartan (COZAAR) 100 MG tablet       fenofibrate (TRICOR) 145 MG tablet Take 145 mg by mouth daily      leuprolide (LUPRON) 45 MG injection Inject 45 mg into the muscle once      denosumab (PROLIA) 60 MG/ML SOLN SC injection Inject 60 mg into the skin once      aspirin 81 MG tablet Take 325 mg by mouth daily       No current facility-administered medications on file prior to visit. No Known Allergies    Family History   Problem Relation Age of Onset    Heart Disease Mother     Heart Disease Father     Cancer Father        Social History     Socioeconomic History    Marital status:       Spouse name: Not on file    Number of children: Not on file    Years of education: Not on file    Highest education level: Not on file   Occupational History    Not on file   Tobacco Use    Smoking status: Every Day     Packs/day: 1.00     Years: 50.00     Pack years: 50.00     Types: Cigarettes    Smokeless tobacco: Never   Substance and Sexual Activity    Alcohol use: Yes     Comment: rarely    Drug use: No    Sexual activity: Not on file   Other Topics Concern    Not on file   Social History Narrative    Not on file     Social Determinants of Health     Financial Resource Strain: Not on file   Food Insecurity: Not on file   Transportation Needs: Not on file   Physical Activity: Not on file   Stress: Not on file   Social Connections: Not on file   Intimate Partner Violence: Not on file   Housing Stability: Not on file       Review of Systems  Constitutional: Negative for fatigue, fever and unexpected weight change. HENT: Negative for congestion and trouble swallowing. Eyes: Negative for pain and itching. Respiratory: Negative for cough and shortness of breath. Cardiovascular: Negative for chest pain and leg swelling. Gastrointestinal: Negative for abdominal pain, constipation, diarrhea and nausea. Endocrine: Negative for cold intolerance and heat intolerance. Genitourinary: See HPI. Musculoskeletal: Positive for mild chronic low back pain. No joint swelling. Skin: Negative for rash. Neurological: Negative for dizziness, weakness, numbness and headaches. Psychiatric/Behavioral: The patient is not nervous/anxious. Exam  /80   Pulse 96   Ht 5' 8\" (1.727 m)   Wt 172 lb (78 kg)   BMI 26.15 kg/m²     Constitutional: Oriented to person, place, and time. Vital signs are normal. Appears well-developed and well-nourished. Cooperative. No distress. HENT:    Head: Normocephalic and atraumatic. Eyes: EOM are normal. Pupils are equal, round, and reactive to light. Right eye exhibits no discharge. Left eye exhibits no discharge. No scleral icterus. Neck: Trachea normal. No JVD present. Cardiovascular: Normal rate and regular rhythm. S1/S2. Pulmonary/Chest: Effort normal. No respiratory distress. No wheezes, rhonchi, or rales. Abdominal: Soft. Exhibits no distension or generalized tenderness. There is no rebound, rigidity, or guarding. No CVA tenderness. Musculoskeletal: No pitting edema or calf tenderness.    Lymphadenopathy: Right: No supraclavicular adenopathy present. Left: No supraclavicular adenopathy present. Neurological: Alert and oriented to person, place, and time. No cranial nerve deficit. Skin: Skin is warm and dry. Not diaphoretic. Psychiatric: Normal mood and affect. Behavior is normal.   Nursing note and vitals reviewed. Labs    Results for POC orders placed in visit on 09/12/22   POCT Urinalysis No Micro (Auto)   Result Value Ref Range    Glucose, Ur Negative NEGATIVE mg/dl    Bilirubin Urine Small (A)     Ketones, Urine Negative NEGATIVE    Specific Gravity, Urine >= 1.030 1.002 - 1.030    Blood, UA POC Large (A) NEGATIVE    pH, Urine 5.50 5.0 - 9.0    Protein, Urine 30 (A) NEGATIVE mg/dl    Urobilinogen, Urine 0.20 0.0 - 1.0 eu/dl    Nitrite, Urine Negative NEGATIVE    Leukocyte Clumps, Urine Negative NEGATIVE    Color, Urine Yellow YELLOW-STRAW    Character, Urine Clear CLR-SL.CLOUD   poct post void residual   Result Value Ref Range    post void residual 26 ml       Lab Results   Component Value Date    CREATININE 1.30 10/22/2019    BUN 21 10/22/2019     10/22/2019    K 4.0 10/22/2019     (H) 10/22/2019    CO2 19 (L) 10/22/2019       Lab Results   Component Value Date    PSA  01/27/2020      Comment:      <0.01    PSA  01/28/2019      Comment:      <0.014    PSA 0.07 12/11/2017     PSA Testosterone Date   <0.02 <12 08/2022   <0.01 12 02/07/22   <0.01 0.19 07/23/21   <0.01 0.26 01/21/21   <0.01 0.26 07/27/20   <0.01 0.17 01/2020    <0.01  0.14  07/22/19   <0.014 10 01/2019   <0.014   10/2018   < 0.014   07/2018   <0.014   05/2018   <0.014   03/2018   0.01   01/11/2018   0.07   12/11/2017   1.34   10/18/17   1.18   09/09/2017   0.78   06/12/17   0.54   03/17   0.36   11/28/2016   0.08   05/06/2014       Assessment/Plan:  1. Prostate cancer- Status post RALP with bilateral lymph node dissection in 6/12.  His pathology was significant for [place] 4+5=9 disease with negative margins and negative nodes. Status post 6600cGy external beam radiation in August 2016. He started [place] in October 2017 and switched to Lupron 45 mg IM injections (January 2018) every six months for convenience. DEXA scan (1/18) revealed osteopenia. Started Prolia every six months. DEXA scan was repeated in January 2020. Lupron 45 mg IM injection and Prolia today. Mr. Kwesi Rousseau will return in six months for status check and injection therapy. Call with any questions or concerns. PSA levels are monitored every three to six months and have been stable. Will obtain PSA, testosterone, calcium every six months. He is trying to decide if he wants to continue therapy or take a drug holiday. Will discuss again at next visit. 2. History of Microscopic Hematuria/History of Atypical Cells on urine cytology- Urine dip is negative today. Status post negative office cystoscopy in January 2017. CT scan of abdomen and pelvis in April 2017 revealed nonobstructive nephrolithiasis of the inferior pole calyces of the right kidney. No evidence of malignancy. Urine cytology in July 2020 was negative. Bladder Cx in October 2018 was normal. He declined cytology today. His last in February 2022 was negative. 3. Inguinal Hernias- Resolved. Status post Robotic repair of right indirect and direct inguinal hernia and left direct inguinal hernia with mesh on July 25, 2017.     4. Osteopenia- DEXA scan 1/18 revealed osteopenia. Started Prolia injections every six months. Repeat DEXA in 1/2020 reveals continued osteopenia but mild improvement. Continue Prolia, Vitamin D, and 1200 mg calcium daily. DEXA was due 1/2022. He does not wish to repeat DEXA at this time. Will re-address at next appointment. Discussed sources of dietary calcium versus calcium supplementation. Smoking cessation would be helpful. 5. Mixed Incontinence- Currently asymptomatic. He has discontinued his Myrbetriq 25 mg daily. Continue to monitor. 6. Hypercalcemia- Mild at 10.7. Will check ionized calcium and PTH. Instructed to consume 2.5 Liters of fluid daily. Will call with lab results.

## 2022-09-12 ENCOUNTER — OFFICE VISIT (OUTPATIENT)
Dept: UROLOGY | Age: 72
End: 2022-09-12
Payer: MEDICARE

## 2022-09-12 ENCOUNTER — NURSE ONLY (OUTPATIENT)
Dept: UROLOGY | Age: 72
End: 2022-09-12
Payer: MEDICARE

## 2022-09-12 VITALS
HEART RATE: 96 BPM | DIASTOLIC BLOOD PRESSURE: 80 MMHG | BODY MASS INDEX: 26.07 KG/M2 | WEIGHT: 172 LBS | SYSTOLIC BLOOD PRESSURE: 120 MMHG | HEIGHT: 68 IN

## 2022-09-12 DIAGNOSIS — C61 MALIGNANT NEOPLASM OF PROSTATE (HCC): Primary | ICD-10-CM

## 2022-09-12 DIAGNOSIS — E83.52 HYPERCALCEMIA: ICD-10-CM

## 2022-09-12 DIAGNOSIS — M81.8 IDIOPATHIC OSTEOPOROSIS: ICD-10-CM

## 2022-09-12 DIAGNOSIS — R35.0 URINARY FREQUENCY: Primary | ICD-10-CM

## 2022-09-12 LAB
BILIRUBIN URINE: ABNORMAL
BLOOD URINE, POC: ABNORMAL
CHARACTER, URINE: CLEAR
COLOR, URINE: YELLOW
GLUCOSE URINE: NEGATIVE MG/DL
KETONES, URINE: NEGATIVE
LEUKOCYTE CLUMPS, URINE: NEGATIVE
NITRITE, URINE: NEGATIVE
PH, URINE: 5.5 (ref 5–9)
POST VOID RESIDUAL (PVR): 26 ML
PROTEIN, URINE: 30 MG/DL
SPECIFIC GRAVITY, URINE: >= 1.03 (ref 1–1.03)
UROBILINOGEN, URINE: 0.2 EU/DL (ref 0–1)

## 2022-09-12 PROCEDURE — 96401 CHEMO ANTI-NEOPL SQ/IM: CPT | Performed by: UROLOGY

## 2022-09-12 PROCEDURE — 99213 OFFICE O/P EST LOW 20 MIN: CPT | Performed by: PHYSICIAN ASSISTANT

## 2022-09-12 PROCEDURE — 81003 URINALYSIS AUTO W/O SCOPE: CPT | Performed by: PHYSICIAN ASSISTANT

## 2022-09-12 PROCEDURE — 1123F ACP DISCUSS/DSCN MKR DOCD: CPT | Performed by: PHYSICIAN ASSISTANT

## 2022-09-12 PROCEDURE — 51798 US URINE CAPACITY MEASURE: CPT | Performed by: PHYSICIAN ASSISTANT

## 2022-09-12 PROCEDURE — 96402 CHEMO HORMON ANTINEOPL SQ/IM: CPT | Performed by: UROLOGY

## 2022-09-12 NOTE — PROGRESS NOTES
I have personally verified, reviewed, released, signed, authenticated, authorized, confirmed,finalized, and approved the actions of the [facility]. Patient has given me verbal consent to perform Lupron Injection  Yes      Following Dr. Eloy Lazo. LUPRON 45 MG GIVEN I.M rt UOQ HIP  Lot Number: [id number]  Expiration Date: 11/10/2024  [facility] #: [id number]    After Injection was given there were no reactions at injection site and patient was feeling well. Patient was notified that possible side effects from injections include: Redness, swelling and itching at the injection site. Possible side effects of androgen deprivation therapy, including hot flashes, flushing of the skin, increased weight, decreased sex drive, and difficulties with ED. Patient was instructed to call the office with any further questions or concerns. Patient supplied their own medications No      Pt LHRH therapy (Roberts Kocher, Lupron) first initiated on 10/26/2017. Patient has given me verbal consent to perform Prolia Injection  Yes      Following Dr. Eloy Lazo. PROLIA 60MG GIVEN rt S.C. in patient's arm  Lot Number: [id number]  Expiration Date: 07/24  [facility] #: [id number]    After Injection was given there were no reactions at injection site and patient was feeling well. Patient was notified that possible side effects from injections include: Redness, swelling and itching at the injection site. Possible side effects of androgen deprivation therapy, including hot flashes, flushing of the skin, increased weight, decreased sex drive, and difficulties with ED. Patient was instructed to inform their dental office that they are receiving Prolia and that major dental procedures should be avoided. Patient was advised to call our office with any further questions or concerns. Any active dental problems, infections, or pain? No    Date of last dental appointment:  unkown    Any planned dental procedures?   No    Patient supplied their own medications No      Pt LHRH therapy (Firmagon, Eligard, Lupron) first initiated on 10/26/2017.

## 2022-09-22 LAB
CALCIUM IONIZED SERUM: 5.02 MG/DL (ref 4.7–5.9)
PARATHYROID HORMONE INTACT: 45 PG/ML (ref 15–65)

## 2023-02-20 DIAGNOSIS — C61 PROSTATE CANCER (HCC): Primary | ICD-10-CM

## 2023-03-10 ENCOUNTER — TELEPHONE (OUTPATIENT)
Dept: UROLOGY | Age: 73
End: 2023-03-10

## 2023-03-13 ENCOUNTER — TELEPHONE (OUTPATIENT)
Dept: UROLOGY | Age: 73
End: 2023-03-13

## 2023-03-13 NOTE — TELEPHONE ENCOUNTER
Prior Auth initiated for prolia . PA sent to [facility].       Approved 3/16/23-3/16/24    Ref# [id number]

## 2023-03-16 ENCOUNTER — NURSE ONLY (OUTPATIENT)
Dept: UROLOGY | Age: 73
End: 2023-03-16
Payer: MEDICARE

## 2023-03-16 ENCOUNTER — OFFICE VISIT (OUTPATIENT)
Dept: UROLOGY | Age: 73
End: 2023-03-16

## 2023-03-16 VITALS
BODY MASS INDEX: 26.07 KG/M2 | SYSTOLIC BLOOD PRESSURE: 132 MMHG | HEIGHT: 68 IN | WEIGHT: 172 LBS | DIASTOLIC BLOOD PRESSURE: 86 MMHG

## 2023-03-16 DIAGNOSIS — R35.0 URINARY FREQUENCY: Primary | ICD-10-CM

## 2023-03-16 DIAGNOSIS — C61 MALIGNANT NEOPLASM OF PROSTATE (HCC): Primary | ICD-10-CM

## 2023-03-16 DIAGNOSIS — M81.8 IDIOPATHIC OSTEOPOROSIS: ICD-10-CM

## 2023-03-16 DIAGNOSIS — C61 PROSTATE CANCER (HCC): ICD-10-CM

## 2023-03-16 DIAGNOSIS — R31.29 PERSISTENT MICROSCOPIC HEMATURIA: ICD-10-CM

## 2023-03-16 LAB
BILIRUBIN URINE: NEGATIVE
BLOOD URINE, POC: ABNORMAL
CHARACTER, URINE: ABNORMAL
COLOR, URINE: YELLOW
GLUCOSE URINE: NEGATIVE MG/DL
KETONES, URINE: NEGATIVE
LEUKOCYTE CLUMPS, URINE: NEGATIVE
NITRITE, URINE: NEGATIVE
PH, URINE: 6.5 (ref 5–9)
POST VOID RESIDUAL (PVR): 90 ML
PROTEIN, URINE: 30 MG/DL
SPECIFIC GRAVITY, URINE: 1.02 (ref 1–1.03)
UROBILINOGEN, URINE: 0.2 EU/DL (ref 0–1)

## 2023-03-16 PROCEDURE — 96402 CHEMO HORMON ANTINEOPL SQ/IM: CPT | Performed by: UROLOGY

## 2023-03-16 PROCEDURE — 96401 CHEMO ANTI-NEOPL SQ/IM: CPT | Performed by: UROLOGY

## 2023-03-16 RX ORDER — CALCIUM CARBONATE 500(1250)
500 TABLET ORAL DAILY
COMMUNITY

## 2023-03-16 NOTE — PROGRESS NOTES
[address] Joslyn [place] [address]  Dept: [phone]  Loc: [phone]      Mr. Ernesto Mueller was seen in follow up for   Chief Complaint   Patient presents with    Prostate Cancer    Urinary Frequency        HPI:  Mr. Ernesto Mueller is a 77-year-old male with a history of prostate cancer. He is status post RALP with bilateral lymph node dissection in 6/12. His pathology was significant for [place] 4+5=9 disease with negative margins and negative nodes. He underwent 6600 cGy external beam radiation therapy in August 2016. Unfortunately, his PSA level started to rise and he was started on Firmagon injections in October 2017. He completed three Firmagon injections and underwent a DEXA scan (1/18) which revealed osteopenia. He was started on Lupron and Prolia 6 month injections in January 2018. His most recent DEXA scan (1/20) revealed continued osteopenia with mild improvement compared to his 2018 study. In regards to his urinary health, he reports mild incontinence with movement and coughing. He does experience mild urinary urgency through the day but denies nocturia. He was started on Detrol LA but it was ineffective. He was switched to  Myrbetriq 25 mg daily and noticed a mild to moderate improvement of his irritative symptoms. He discontinued Myrbetriq and has not noticed a worsening of his symptoms. He does have to wear a pad and perform intermittent pad changes through the day. He denies weakened stream, retention, gross hematuria, or nocturia. In regards to his general medical health, he denies cough, dyspnea, chest pain, unexplained fevers, weight changes, fatigue, hematochezia, or dyschezia. He denies any significant hot flashes, night sweats, or fatigue. He presents today to discuss his most recent PSA level and continuation of therapy.     Past Medical History:   Diagnosis Date    BBB (bundle branch block)     FH: CAD (coronary artery disease)     Gout     HLD (hyperlipidemia)     Hypertension     Malignant neoplasm of prostate ([address]     Obesity        Past Surgical History:   Procedure Laterality Date    CARDIOVASCULAR STRESS TEST  9 29 2010    Cannot exclude mild inferior stress induced ischemia. EF 58%. Mildly abnormal nuclear scan    COLONOSCOPY  05/2017    COLONOSCOPY  06/02/2017    Dr Gale Carranza Bilateral 7/25/2017    HERNIA INGUINAL REPAIR LAPAROSCOPIC ROBOTIC performed by Yarelis Altman MD at [address] [place] [address]    TRANSTHORACIC ECHOCARDIOGRAM  9 29 2010    LV size and systolic function normal. EF 55-65%. Grade 1 diastolic dysfunction. Mild TR. Current Outpatient Medications on File Prior to Visit   Medication Sig Dispense Refill    calcium carbonate (OSCAL) 500 MG TABS tablet Take 500 mg by mouth daily      mirabegron (MYRBETRIQ) 25 MG TB24 Take 25 mg by mouth daily      Magnesium 500 MG CAPS Take by mouth      losartan (COZAAR) 100 MG tablet       fenofibrate (TRICOR) 145 MG tablet Take 145 mg by mouth daily      leuprolide (LUPRON) 45 MG injection Inject 45 mg into the muscle once      denosumab (PROLIA) 60 MG/ML SOLN SC injection Inject 60 mg into the skin once       Current Facility-Administered Medications on File Prior to Visit   Medication Dose Route Frequency Provider Last Rate Last Admin    leuprolide (LUPRON) injection 45 mg  45 mg IntraMUSCular Once Donna Wesley PA-C        denosumab (PROLIA) SC injection 60 mg  60 mg SubCUTAneous Once Donna Wesley PA-C           No Known Allergies    Family History   Problem Relation Age of Onset    Heart Disease Mother     Heart Disease Father     Cancer Father        Social History     Socioeconomic History    Marital status:       Spouse name: Not on file    Number of children: Not on file    Years of education: Not on file    Highest education level: Not on file   Occupational History    Not on file   Tobacco Use    Smoking status: Every Day     Packs/day: 1.00     Years: 50.00     Pack years: 50.00     Types: Cigarettes    Smokeless tobacco: Never   Substance and Sexual Activity    Alcohol use: Yes     Comment: rarely    Drug use: No    Sexual activity: Not on file   Other Topics Concern    Not on file   Social History Narrative    Not on file     Social Determinants of Health     Financial Resource Strain: Not on file   Food Insecurity: Not on file   Transportation Needs: Not on file   Physical Activity: Not on file   Stress: Not on file   Social Connections: Not on file   Intimate Partner Violence: Not on file   Housing Stability: Not on file       Review of Systems  Constitutional: Negative for fatigue, fever and unexpected weight change. HENT: Negative for congestion and trouble swallowing. Eyes: Negative for pain and itching. Respiratory: Negative for cough and shortness of breath. Cardiovascular: Negative for chest pain and leg swelling. Gastrointestinal: Negative for abdominal pain, constipation, diarrhea and nausea. Endocrine: Negative for cold intolerance and heat intolerance. Genitourinary: See HPI. Musculoskeletal: Positive for mild low back pain and joint swelling. Skin: Negative for rash. Neurological: Negative for dizziness, weakness, numbness and headaches. Psychiatric/Behavioral: The patient is not nervous/anxious. Exam  /86   Ht 5' 8\" (1.727 m)   Wt 172 lb (78 kg)   BMI 26.15 kg/m²     Constitutional: Oriented to person, place, and time. Vital signs are normal. Appears well-developed and well-nourished. Cooperative. No distress. HENT:    Head: Normocephalic and atraumatic. Eyes: EOM are normal. Pupils are equal, round, and reactive to light. Right eye exhibits no discharge. Left eye exhibits no discharge. No scleral icterus. Neck: Trachea normal. No JVD present. Cardiovascular: Normal rate and regular rhythm. S1/S2.   Pulmonary/Chest: Effort normal. No respiratory distress. No wheezes, rhonchi, or rales. Abdominal: Soft. Exhibits no distension or generalized tenderness. There is no rebound, rigidity, or guarding. No CVA tenderness. Musculoskeletal: No pitting edema or calf tenderness. Lymphadenopathy:   Right: No supraclavicular adenopathy present. Left: No supraclavicular adenopathy present. Neurological: Alert and oriented to person, place, and time. No cranial nerve deficit. Skin: Skin is warm and dry. Not diaphoretic. Psychiatric: Normal mood and affect. Behavior is normal.   Nursing note and vitals reviewed.        Labs    Results for POC orders placed in visit on 03/16/23   POCT Urinalysis No Micro (Auto)   Result Value Ref Range    Glucose, Ur Negative NEGATIVE mg/dl    Bilirubin Urine Negative     Ketones, Urine Negative NEGATIVE    Specific Gravity, Urine 1.025 1.002 - 1.030    Blood, UA POC Moderate (A) NEGATIVE    pH, Urine 6.50 5.0 - 9.0    Protein, Urine 30 (A) NEGATIVE mg/dl    Urobilinogen, Urine 0.20 0.0 - 1.0 eu/dl    Nitrite, Urine Negative NEGATIVE    Leukocyte Clumps, Urine Negative NEGATIVE    Color, Urine Yellow YELLOW-STRAW    Character, Urine Cloudy (A) CLR-SL.CLOUD   poct post void residual   Result Value Ref Range    post void residual 90 ml       Lab Results   Component Value Date    CREATININE 1.12 09/20/2022    BUN 22 09/20/2022     09/20/2022    K 4.5 09/20/2022     (H) 09/20/2022    CO2 21 09/20/2022       Lab Results   Component Value Date    PSA  01/27/2020      Comment:      <0.01    PSA  01/28/2019      Comment:      <0.014    PSA 0.07 12/11/2017     PSA Testosterone Date     <0.02  03/06/23   0.02 <12 08/2022   <0.01 12 02/07/22   <0.01 0.19 07/23/21   <0.01 0.26 01/21/21   <0.01 0.26 07/27/20   <0.01 0.17 01/2020    <0.01  0.14  07/22/19   <0.014 10 01/2019   <0.014   10/2018   < 0.014   07/2018   <0.014   05/2018   <0.014   03/2018   0.01   01/11/2018   0.07   12/11/2017   1.34   10/18/17   1.18   09/09/2017   0.78 06/12/17   0.54   03/17   0.36   11/28/2016   0.08   05/06/2014       Assessment/Plan:    1. Prostate cancer- Status post RALP with bilateral lymph node dissection in 6/12. His pathology was significant for [place] 4+5=9 disease with negative margins and negative nodes. Status post 6600cGy external beam radiation in August 2016. He started [place] in October 2017 and switched to Lupron 45 mg IM injections (January 2018) every six months for convenience. DEXA scan (1/18) revealed osteopenia. Started Prolia every six months. DEXA scan was repeated in January 2020. Lupron 45 mg IM injection and Prolia today. Mr. Roxanne Marie will return in six months for status check and injection therapy. Call with any questions or concerns. PSA levels are monitored every three to six months and have been stable. Will obtain PSA, testosterone, calcium every six months. He wishes to continue therapy at this time. 2. History of Microscopic Hematuria/History of Atypical Cells on urine cytology- Urine dip is negative today. Status post negative office cystoscopy in January 2017. CT scan of abdomen and pelvis in April 2017 revealed nonobstructive nephrolithiasis of the inferior pole calyces of the right kidney. No evidence of malignancy. Urine cytology in July 2020 was negative. Bladder Cx in October 2018 was normal. His last in February 2022 was negative. Order given for patient to obtain cytology. 3. Inguinal Hernias- Resolved. Status post Robotic repair of right indirect and direct inguinal hernia and left direct inguinal hernia with mesh on July 25, 2017.     4. Osteopenia- DEXA scan 1/18 revealed osteopenia. Started Prolia injections every six months. Repeat DEXA in 1/2020 reveals continued osteopenia but mild improvement. Continue Prolia, Vitamin D, and 1200 mg calcium daily. DEXA was due 1/2022. He does not wish to repeat DEXA at this time. Will re-address at next appointment.   Discussed sources of dietary calcium versus calcium supplementation. Smoking cessation would be helpful. 5. Mixed Incontinence- Currently asymptomatic. He has discontinued his Myrbetriq 25 mg daily. Continue to monitor. 6. Hypercalcemia- Ionized calcium and PTH normal last testing in 9/22. Awaiting results of BMP.

## 2023-03-16 NOTE — PROGRESS NOTES
Patient has given me verbal consent to perform Lupron Injection  Yes      Following Dr. Janette Galeana of [facility]. LUPRON 45 MG GIVEN I.M left UOQ HIP  Lot Number: [id number]  Expiration Date: 4/19/2025  [facility] #: [id number]    After Injection was given there were no reactions at injection site and patient was feeling well. Patient was notified that possible side effects from injections include: Redness, swelling and itching at the injection site. Possible side effects of androgen deprivation therapy, including hot flashes, flushing of the skin, increased weight, decreased sex drive, and difficulties with ED. Patient was instructed to call the office with any further questions or concerns. Patient supplied their own medications No      Pt LHRH therapy (Pricilla Bernheim, Lupron) first initiated on 10/26/2017. Patient has given me verbal consent to perform Prolia Injection  Yes    Patient's last Calcium level was 9.0 on the date of 9/20/22    Following Dr. Janette Galeana of [facility]. PROLIA 60MG GIVEN left S.C. in patient's arm  Lot Number: [id number]  Expiration Date: 4/30/2025  [facility] #: [id number]    After Injection was given there were no reactions at injection site and patient was feeling well. Patient was notified that possible side effects from injections include: Redness, swelling and itching at the injection site. Possible side effects of androgen deprivation therapy, including hot flashes, flushing of the skin, increased weight, decreased sex drive, and difficulties with ED. Patient was instructed to inform their dental office that they are receiving Prolia and that major dental procedures should be avoided. Patient was advised to call our office with any further questions or concerns. Any active dental problems, infections, or pain? No    Date of last dental appointment:  No    Any planned dental procedures?   No    Patient supplied their own medications No      Pt LHRH therapy (Pricilla Bernheim, Lupron)

## 2023-03-17 ENCOUNTER — TELEPHONE (OUTPATIENT)
Dept: UROLOGY | Age: 73
End: 2023-03-17

## 2023-09-05 LAB
ANION GAP SERPL CALCULATED.3IONS-SCNC: 12 MEQ/L (ref 7–16)
BUN BLDV-MCNC: 33 MG/DL (ref 8–23)
CALCIUM SERPL-MCNC: 10.9 MG/DL (ref 8.5–10.5)
CHLORIDE BLD-SCNC: 109 MEQ/L (ref 95–107)
CO2: 23 MEQ/L (ref 19–31)
CREAT SERPL-MCNC: 1.36 MG/DL (ref 0.8–1.4)
EGFR IF NONAFRICAN AMERICAN: 54 ML/MIN/1.73
GLUCOSE: 105 MG/DL (ref 70–99)
POTASSIUM SERPL-SCNC: 4 MEQ/L (ref 3.5–5.4)
PSA, ULTRASENSITIVE: <0.02 NG/ML
SODIUM BLD-SCNC: 144 MEQ/L (ref 133–146)
TESTOSTERONE TOTAL: <12 NG/DL (ref 300–720)

## 2023-09-13 ENCOUNTER — TELEPHONE (OUTPATIENT)
Dept: UROLOGY | Age: 73
End: 2023-09-13

## 2023-09-13 NOTE — TELEPHONE ENCOUNTER
Per [facility]  lupron approved 9/21/2023-9/21/2024    See scan     Prolia  Approved 3/16/23-3/16/24   Ref# [id number]

## 2023-09-21 ENCOUNTER — OFFICE VISIT (OUTPATIENT)
Dept: UROLOGY | Age: 73
End: 2023-09-21
Payer: MEDICARE

## 2023-09-21 ENCOUNTER — NURSE ONLY (OUTPATIENT)
Dept: UROLOGY | Age: 73
End: 2023-09-21
Payer: MEDICARE

## 2023-09-21 ENCOUNTER — TELEPHONE (OUTPATIENT)
Dept: UROLOGY | Age: 73
End: 2023-09-21

## 2023-09-21 VITALS
DIASTOLIC BLOOD PRESSURE: 72 MMHG | HEIGHT: 68 IN | SYSTOLIC BLOOD PRESSURE: 128 MMHG | BODY MASS INDEX: 25.61 KG/M2 | WEIGHT: 169 LBS

## 2023-09-21 DIAGNOSIS — C61 MALIGNANT NEOPLASM OF PROSTATE (HCC): Primary | ICD-10-CM

## 2023-09-21 DIAGNOSIS — M81.8 IDIOPATHIC OSTEOPOROSIS: ICD-10-CM

## 2023-09-21 LAB
BILIRUBIN, POC: NEGATIVE
BLOOD URINE, POC: NORMAL
CLARITY, POC: CLEAR
COLOR, POC: YELLOW
GLUCOSE URINE, POC: NEGATIVE
KETONES, POC: NEGATIVE
LEUKOCYTE EST, POC: NEGATIVE
NITRITE, POC: NEGATIVE
PH, POC: 5.5
PROTEIN, POC: 100
SPECIFIC GRAVITY, POC: >=1.03
UROBILINOGEN, POC: 0.2

## 2023-09-21 PROCEDURE — 4004F PT TOBACCO SCREEN RCVD TLK: CPT | Performed by: PHYSICIAN ASSISTANT

## 2023-09-21 PROCEDURE — G8417 CALC BMI ABV UP PARAM F/U: HCPCS | Performed by: PHYSICIAN ASSISTANT

## 2023-09-21 PROCEDURE — 3017F COLORECTAL CA SCREEN DOC REV: CPT | Performed by: PHYSICIAN ASSISTANT

## 2023-09-21 PROCEDURE — 1123F ACP DISCUSS/DSCN MKR DOCD: CPT | Performed by: PHYSICIAN ASSISTANT

## 2023-09-21 PROCEDURE — 81003 URINALYSIS AUTO W/O SCOPE: CPT | Performed by: PHYSICIAN ASSISTANT

## 2023-09-21 PROCEDURE — 99213 OFFICE O/P EST LOW 20 MIN: CPT | Performed by: PHYSICIAN ASSISTANT

## 2023-09-21 PROCEDURE — G8427 DOCREV CUR MEDS BY ELIG CLIN: HCPCS | Performed by: PHYSICIAN ASSISTANT

## 2023-09-21 RX ORDER — ASPIRIN 325 MG
325 TABLET ORAL DAILY
COMMUNITY

## 2023-09-21 NOTE — PROGRESS NOTES
No respiratory distress. No wheezes, rhonchi, or rales. Abdominal: Soft. Exhibits no distension or generalized tenderness. There is no rebound, rigidity, or guarding. No CVA tenderness. Musculoskeletal: No pitting edema or calf tenderness. Lymphadenopathy:   Right: No supraclavicular adenopathy present. Left: No supraclavicular adenopathy present. Neurological: Alert and oriented to person, place, and time. No cranial nerve deficit. Skin: Skin is warm and dry. Not diaphoretic. Psychiatric: Normal mood and affect. Behavior is normal.   Nursing note and vitals reviewed. Labs    Results for POC orders placed in visit on 09/21/23   POCT Urinalysis No Micro (Auto)   Result Value Ref Range    Color, UA Yellow     Clarity, UA Clear     Glucose, UA POC Negative     Bilirubin, UA Negative     Ketones, UA Negative     Spec Grav, UA >=1.030     Blood, UA POC Large     pH, UA 5.5     Protein, UA      Urobilinogen, UA 0.2     Leukocytes, UA Negative     Nitrite, UA Negative        Lab Results   Component Value Date    CREATININE 1.36 09/05/2023    BUN 33 (H) 09/05/2023     09/05/2023    K 4.0 09/05/2023     (H) 09/05/2023    CO2 23 09/05/2023       Lab Results   Component Value Date    PSA  01/27/2020      Comment:      <0.01    PSA  01/28/2019      Comment:      <0.014    PSA 0.07 12/11/2017         Assessment/Plan:    1. Prostate cancer- Status post RALP with bilateral lymph node dissection in 6/12. His pathology was significant for Vinnie 4+5=9 disease with negative margins and negative nodes. Status post 6600cGy external beam radiation in August 2016. He started [place] in October 2017 and switched to Lupron 45 mg IM injections (January 2018) every six months for convenience. DEXA scan (1/18) revealed osteopenia. Started Prolia every six months. DEXA scan was repeated in January 2020. Lupron 45 mg IM injection and Prolia today.  Mr. Jorge A Nicole will return in six months for status check and

## 2023-09-21 NOTE — PROGRESS NOTES
Patient has given me verbal consent to perform Lupron Injection  Yes      Following Derian Vidal NP's plan of care. LUPRON 45 MG GIVEN I.M right UOQ HIP  Lot Number: [id number]  Expiration Date: 07/20/2025  [facility] #: [id number]    After Injection was given there were no reactions at injection site and patient was feeling well. Patient was notified that possible side effects from injections include: Redness, swelling and itching at the injection site. Possible side effects of androgen deprivation therapy, including hot flashes, flushing of the skin, increased weight, decreased sex drive, and difficulties with ED. Patient was instructed to call the office with any further questions or concerns. Patient supplied their own medications No      Pt LHRH therapy (Gabby Gore, Lupron) first initiated on 10/16/2017.

## 2023-09-21 NOTE — TELEPHONE ENCOUNTER
Patient scheduled for DEXA SCAN  at [facility] on 9/22/23. Arrival of 810AM for a 820AM scan time.   Order with instructions  given to the patient in the office

## 2023-09-22 ENCOUNTER — HOSPITAL ENCOUNTER (OUTPATIENT)
Dept: WOMENS IMAGING | Age: 73
Discharge: HOME | End: 2023-09-22
Payer: MEDICARE

## 2023-09-22 DIAGNOSIS — C61 MALIGNANT NEOPLASM OF PROSTATE (HCC): ICD-10-CM

## 2023-09-22 DIAGNOSIS — M81.8 IDIOPATHIC OSTEOPOROSIS: ICD-10-CM

## 2023-09-22 PROCEDURE — 77080 DXA BONE DENSITY AXIAL: CPT

## 2023-10-27 ENCOUNTER — TELEPHONE (OUTPATIENT)
Dept: UROLOGY | Age: 73
End: 2023-10-27

## 2023-10-28 NOTE — TELEPHONE ENCOUNTER
Please let MrChano West know that his bone density scan demonstrates stable osteopenia compared with his imaging from three years ago. Will continue with injections has scheduled.

## 2024-03-05 LAB
PSA, ULTRASENSITIVE: <0.02 NG/ML
TESTOSTERONE TOTAL: <12 NG/DL (ref 300–720)

## 2024-03-07 LAB — CALCIUM IONIZED SERUM: 5.36 MG/DL (ref 4.7–5.9)

## 2024-03-13 ENCOUNTER — TELEPHONE (OUTPATIENT)
Dept: UROLOGY | Age: 74
End: 2024-03-13

## 2024-03-25 ENCOUNTER — OFFICE VISIT (OUTPATIENT)
Dept: UROLOGY | Age: 74
End: 2024-03-25
Payer: MEDICARE

## 2024-03-25 ENCOUNTER — NURSE ONLY (OUTPATIENT)
Dept: UROLOGY | Age: 74
End: 2024-03-25
Payer: MEDICARE

## 2024-03-25 VITALS
BODY MASS INDEX: 25.9 KG/M2 | SYSTOLIC BLOOD PRESSURE: 128 MMHG | HEIGHT: 68 IN | WEIGHT: 170.9 LBS | DIASTOLIC BLOOD PRESSURE: 88 MMHG

## 2024-03-25 DIAGNOSIS — R31.29 MICROSCOPIC HEMATURIA: ICD-10-CM

## 2024-03-25 DIAGNOSIS — C61 MALIGNANT NEOPLASM OF PROSTATE (HCC): Primary | ICD-10-CM

## 2024-03-25 LAB
BILIRUBIN URINE: NEGATIVE
BLOOD URINE, POC: ABNORMAL
CHARACTER, URINE: CLEAR
COLOR, URINE: YELLOW
GLUCOSE URINE: NEGATIVE MG/DL
KETONES, URINE: NEGATIVE
LEUKOCYTE CLUMPS, URINE: NEGATIVE
NITRITE, URINE: NEGATIVE
PH, URINE: 5.5 (ref 5–9)
PROTEIN, URINE: 30 MG/DL
SPECIFIC GRAVITY, URINE: >= 1.03 (ref 1–1.03)
UROBILINOGEN, URINE: 0.2 EU/DL (ref 0–1)

## 2024-03-25 PROCEDURE — 4004F PT TOBACCO SCREEN RCVD TLK: CPT | Performed by: PHYSICIAN ASSISTANT

## 2024-03-25 PROCEDURE — G8427 DOCREV CUR MEDS BY ELIG CLIN: HCPCS | Performed by: PHYSICIAN ASSISTANT

## 2024-03-25 PROCEDURE — 1123F ACP DISCUSS/DSCN MKR DOCD: CPT | Performed by: PHYSICIAN ASSISTANT

## 2024-03-25 PROCEDURE — 99213 OFFICE O/P EST LOW 20 MIN: CPT | Performed by: PHYSICIAN ASSISTANT

## 2024-03-25 PROCEDURE — 81003 URINALYSIS AUTO W/O SCOPE: CPT | Performed by: PHYSICIAN ASSISTANT

## 2024-03-25 PROCEDURE — G8417 CALC BMI ABV UP PARAM F/U: HCPCS | Performed by: PHYSICIAN ASSISTANT

## 2024-03-25 PROCEDURE — 96402 CHEMO HORMON ANTINEOPL SQ/IM: CPT

## 2024-03-25 PROCEDURE — G8484 FLU IMMUNIZE NO ADMIN: HCPCS | Performed by: PHYSICIAN ASSISTANT

## 2024-03-25 NOTE — PROGRESS NOTES
Patient has given me verbal consent to perform Prolia Injection  {YES / NO:19727}    DIAGNOSIS/INDICATION:  ***    Patient's last Calcium level was *** on the date of ***.    Following {Providers in office:17582} plan of care.  PROLIA 60MG GIVEN {left/right:84940} S.C. in patient's {arm/abd:46760}  Lot Number: ***  Expiration Date: ***  [facility] #: 66306-992-94    After Injection was given there were no reactions at injection site and patient was feeling well. Patient was notified that possible side effects from injections include: Redness, swelling and itching at the injection site. Possible side effects of androgen deprivation therapy, including hot flashes, flushing of the skin, increased weight, decreased sex drive, and difficulties with ED. Patient was instructed to inform their dental office that they are receiving Prolia and that major dental procedures should be avoided. Patient was advised to call our office with any further questions or concerns.     Any active dental problems, infections, or pain?  {YES / NO:19727}    Date of last dental appointment: ***    Any planned dental procedures?  {YES / NO:19727}    Patient supplied their own medications {YES / NO:19727}      Pt LHRH therapy (Firmagon, Eligard, Lupron) first initiated on ***.     Patient has given me verbal consent to perform Lupron Injection  {YES / NO:19727}    DIAGNOSIS/INDICATION:  ***    Following {Providers in office:45762} plan of care.  LUPRON 45 MG GIVEN I.M {left/right:86759} UOQ HIP  Lot Number: ***  Expiration Date: ***  [facility] #: 2070-3963-64    After Injection was given there were no reactions at injection site and patient was feeling well. Patient was notified that possible side effects from injections include: Redness, swelling and itching at the injection site. Possible side effects of androgen deprivation therapy, including hot flashes, flushing of the skin, increased weight, decreased sex drive, and difficulties with ED. Patient was

## 2024-03-25 NOTE — PROGRESS NOTES
Patient has given me verbal consent to perform Lupron Injection  Yes    DIAGNOSIS/INDICATION:  Prostate cancer    Following Bhavik POWERS plan of care.  LUPRON 45 MG GIVEN I.M left UOQ HIP  Lot Number: [id number]  Expiration Date: 01/10/2026  [facility] #: 4192-1364-64    After Injection was given there were no reactions at injection site and patient was feeling well. Patient was notified that possible side effects from injections include: Redness, swelling and itching at the injection site. Possible side effects of androgen deprivation therapy, including hot flashes, flushing of the skin, increased weight, decreased sex drive, and difficulties with ED. Patient was instructed to call the office with any further questions or concerns.     Patient supplied their own medications No      Pt LHRH therapy (Firmagon, Eligard, Lupron) first initiated on 10/16/2017.

## 2024-03-25 NOTE — PROGRESS NOTES
[facility] [place] [facility]  [facility]  [address]  [address]  [facility] [address]  Dept: [phone]  Loc: [phone]      Mr. Sin was seen in follow up for   Chief Complaint   Patient presents with    Prostate Cancer     Labs prior        HPI:  Mr. Sin is a 74-year-old male with a history of prostate cancer. He is status post RALP with bilateral lymph node dissection in 6/12. His pathology was significant for Vinnie 4+5=9 disease with negative margins and negative nodes. He underwent 6600 cGy external beam radiation therapy in August 2016. Unfortunately, his PSA level started to rise and he was started on Firmagon injections in October 2017. He completed three Firmagon injections and underwent a DEXA scan (1/18) which revealed osteopenia. He was started on Lupron and Prolia 6 month injections in January 2018.      In regards to his urinary health, he reports intermittent, mild incontinence with movement and coughing. He does experience mild urinary frequency through the day but denies nocturia. Detrol and Myrbetriq were relatively ineffective. He has declined additional medication. He does have to wear a pad and perform intermittent pad changes through the day. He denies weakened stream, retention, gross hematuria, or nocturia.      In regards to his general medical health, he denies cough, dyspnea, chest pain, unexplained fevers, weight changes, fatigue, hematochezia, or dyschezia. He denies any significant hot flashes, night sweats, or fatigue. He presents today to discuss his most recent PSA level and continuation of therapy.    Past Medical History:   Diagnosis Date    BBB (bundle branch block)     FH: CAD (coronary artery disease)     Gout     HLD (hyperlipidemia)     Hypertension     Malignant neoplasm of prostate (HCC)     Obesity        Past Surgical History:   Procedure Laterality Date    CARDIOVASCULAR STRESS TEST  9 29 2010    Cannot exclude mild inferior

## 2024-03-28 ENCOUNTER — TELEPHONE (OUTPATIENT)
Dept: UROLOGY | Age: 74
End: 2024-03-28

## 2024-03-28 NOTE — TELEPHONE ENCOUNTER
Patient is going to think about having the cystoscopy done and give us a call back if he would like to scheduled this.

## 2024-03-28 NOTE — TELEPHONE ENCOUNTER
Please let Mr. Sin know that his urine cytology demonstrated some abnormal cells. The pathologist believes that some of these cells may be  cancerous. Would he be willing to have an office cytoscopy with Dr. Sullivan to directly visualize his bladder? If so, can we schedule as soon as possible?

## 2024-09-25 ENCOUNTER — TELEPHONE (OUTPATIENT)
Dept: UROLOGY | Age: 74
End: 2024-09-25

## 2024-09-25 LAB
BUN BLDV-MCNC: 29 MG/DL (ref 8–23)
CALCIUM SERPL-MCNC: 10.9 MG/DL (ref 8.6–10.5)
CHLORIDE BLD-SCNC: 105 MMOL/L (ref 96–107)
CO2: 21 MMOL/L (ref 18–32)
CREAT SERPL-MCNC: 1.26 MG/DL (ref 0.67–1.3)
EGFR IF NONAFRICAN AMERICAN: 59 ML/MIN/1.73M2
GLUCOSE: 103 MG/DL (ref 70–100)
POTASSIUM SERPL-SCNC: 4.5 MMOL/L (ref 3.5–5.4)
PSA, ULTRASENSITIVE: <0.014 NG/ML (ref 0–4)
SODIUM BLD-SCNC: 142 MMOL/L (ref 135–148)
TESTOSTERONE TOTAL: 3 NG/DL (ref 193–740)

## 2024-10-03 ENCOUNTER — OFFICE VISIT (OUTPATIENT)
Dept: UROLOGY | Age: 74
End: 2024-10-03
Payer: MEDICARE

## 2024-10-03 ENCOUNTER — NURSE ONLY (OUTPATIENT)
Dept: UROLOGY | Age: 74
End: 2024-10-03
Payer: MEDICARE

## 2024-10-03 VITALS — BODY MASS INDEX: 25.01 KG/M2 | HEIGHT: 68 IN | WEIGHT: 165 LBS

## 2024-10-03 DIAGNOSIS — R31.29 MICROSCOPIC HEMATURIA: Primary | ICD-10-CM

## 2024-10-03 DIAGNOSIS — C61 PROSTATE CANCER (HCC): Primary | ICD-10-CM

## 2024-10-03 DIAGNOSIS — C61 PROSTATE CANCER (HCC): ICD-10-CM

## 2024-10-03 DIAGNOSIS — R82.89 ABNORMAL URINE CYTOLOGY: ICD-10-CM

## 2024-10-03 DIAGNOSIS — M81.8 IDIOPATHIC OSTEOPOROSIS: ICD-10-CM

## 2024-10-03 LAB
BILIRUBIN, URINE: NEGATIVE
BLOOD URINE, POC: ABNORMAL
CHARACTER, URINE: CLEAR
COLOR, UA: YELLOW
GLUCOSE URINE: NEGATIVE MG/DL
KETONES, URINE: NEGATIVE
LEUKOCYTE CLUMPS, URINE: ABNORMAL
NITRITE, URINE: NEGATIVE
PH, URINE: 5.5 (ref 5–9)
PROTEIN, URINE: ABNORMAL MG/DL
SPECIFIC GRAVITY UA: 1.02 (ref 1–1.03)
UROBILINOGEN, URINE: 0.2 EU/DL (ref 0–1)

## 2024-10-03 PROCEDURE — 96402 CHEMO HORMON ANTINEOPL SQ/IM: CPT | Performed by: NURSE PRACTITIONER

## 2024-10-03 PROCEDURE — 81003 URINALYSIS AUTO W/O SCOPE: CPT | Performed by: PHYSICIAN ASSISTANT

## 2024-10-03 PROCEDURE — G8484 FLU IMMUNIZE NO ADMIN: HCPCS | Performed by: PHYSICIAN ASSISTANT

## 2024-10-03 PROCEDURE — 4004F PT TOBACCO SCREEN RCVD TLK: CPT | Performed by: PHYSICIAN ASSISTANT

## 2024-10-03 PROCEDURE — G8417 CALC BMI ABV UP PARAM F/U: HCPCS | Performed by: PHYSICIAN ASSISTANT

## 2024-10-03 PROCEDURE — G8427 DOCREV CUR MEDS BY ELIG CLIN: HCPCS | Performed by: PHYSICIAN ASSISTANT

## 2024-10-03 PROCEDURE — 96372 THER/PROPH/DIAG INJ SC/IM: CPT | Performed by: NURSE PRACTITIONER

## 2024-10-03 PROCEDURE — 99213 OFFICE O/P EST LOW 20 MIN: CPT | Performed by: PHYSICIAN ASSISTANT

## 2024-10-03 PROCEDURE — 1123F ACP DISCUSS/DSCN MKR DOCD: CPT | Performed by: PHYSICIAN ASSISTANT

## 2024-10-03 RX ORDER — CALCIUM CARBONATE 500(1250)
500 TABLET ORAL DAILY
COMMUNITY

## 2024-10-03 NOTE — PROGRESS NOTES
Patient has given me verbal consent to perform Lupron Injection  Yes    DIAGNOSIS/INDICATION:  Prostate ca/ idiopathic osteoporosis    [facility].  LUPRON 45 MG GIVEN I.M right UOQ HIP  Lot Number: [id number]  Expiration Date: 07-  [facility] #: 3784-3911-77    After Injection was given there were no reactions at injection site and patient was feeling well. Patient was notified that possible side effects from injections include: Redness, swelling and itching at the injection site. Possible side effects of androgen deprivation therapy, including hot flashes, flushing of the skin, increased weight, decreased sex drive, and difficulties with ED. Patient was instructed to call the office with any further questions or concerns.     Patient supplied their own medications No      Pt LHRH therapy (Firmagon, Eligard, Lupron, Orgovyx) first initiated on 10-.     Patient has given me verbal consent to perform Prolia Injection  Yes    DIAGNOSIS/INDICATION:  Prostate ca/ idiopathic osteoporosis    Patient's last Calcium level was 10.9 on the date of 09-24-24.    Following Tayla [facility] plan of care.  PROLIA 60MG GIVEN right S.C. in patient's arm  Lot Number: [id number]  Expiration Date: 10-  [facility] #: 55825-873-56    After Injection was given there were no reactions at injection site and patient was feeling well. Patient was notified that possible side effects from injections include: Redness, swelling and itching at the injection site. Possible side effects of androgen deprivation therapy, including hot flashes, flushing of the skin, increased weight, decreased sex drive, and difficulties with ED. Patient was instructed to inform their dental office that they are receiving Prolia and that major dental procedures should be avoided. Patient was advised to call our office with any further questions or concerns.     Any active dental problems, infections, or pain?  Yes and No    Date of last

## 2024-10-03 NOTE — PROGRESS NOTES
[facility] [place] [facility]  [facility]  [address]  [address]  [facility] [address]  Dept: [phone]  Loc: [phone]      Mr. Sin was seen in follow up for   Chief Complaint   Patient presents with    Prostate Cancer        HPI:  Mr. Sin is a 74-year-old male with a history of prostate cancer. He is status post RALP with bilateral lymph node dissection in 6/12. His pathology was significant for Vinnie 4+5=9 disease with negative margins and negative nodes. He underwent 6600 cGy external beam radiation therapy in August 2016. Unfortunately, his PSA level started to rise and he was started on Firmagon injections in October 2017. He completed three Firmagon injections and underwent a DEXA scan (1/18) which revealed osteopenia. He was started on Lupron and Prolia 6 month injections in January 2018.      In regards to his urinary health, he reports intermittent, mild incontinence with movement and coughing. He does experience mild urinary frequency through the day but denies nocturia. Detrol and Myrbetriq were relatively ineffective. He has declined additional medication. He does have to wear a pad and perform intermittent pad changes through the day. He denies weakened stream, retention, gross hematuria, or nocturia.      In regards to his general medical health, he denies cough, dyspnea, chest pain, unexplained fevers, weight changes, fatigue, hematochezia, or dyschezia. He denies any significant hot flashes, night sweats, or fatigue. He presents today to discuss his most recent PSA level and continuation of therapy.    Past Medical History:   Diagnosis Date    BBB (bundle branch block)     FH: CAD (coronary artery disease)     Gout     HLD (hyperlipidemia)     Hypertension     Malignant neoplasm of prostate (HCC)     Obesity        Past Surgical History:   Procedure Laterality Date    CARDIOVASCULAR STRESS TEST  9 29 2010    Cannot exclude mild inferior stress induced

## 2024-10-07 ENCOUNTER — TELEPHONE (OUTPATIENT)
Dept: UROLOGY | Age: 74
End: 2024-10-07

## 2024-10-07 NOTE — TELEPHONE ENCOUNTER
Patient advised the lab results. He is declining the cystoscopy but will call if he changes his mind and voiced understanding to stop the calcium supplements and increase his hydration.

## 2024-10-07 NOTE — TELEPHONE ENCOUNTER
Would you please let Mr. Sin know that his urine cytology came back positive again for atypical cells. Please see if he would be willing to undergo an office cystoscopy to rule out bladder cancer or other pathology.     Also, please let him know that his serum calcium was slightly elevated on his last labs. Please have him increase his daily hydration and stop his calcium supplementation.

## 2025-03-21 ENCOUNTER — TELEPHONE (OUTPATIENT)
Dept: UROLOGY | Age: 75
End: 2025-03-21

## 2025-04-01 LAB
ANION GAP SERPL CALCULATED.3IONS-SCNC: 14 MMOL/L (ref 7–16)
BUN BLDV-MCNC: 39 MG/DL (ref 8–23)
CALCIUM SERPL-MCNC: 10.6 MG/DL (ref 8.6–10.5)
CHLORIDE BLD-SCNC: 106 MMOL/L (ref 96–107)
CO2: 23 MMOL/L (ref 18–32)
CREAT SERPL-MCNC: 1.53 MG/DL (ref 0.67–1.3)
EGFR IF NONAFRICAN AMERICAN: 47 ML/MIN/1.73M2
GLUCOSE: 96 MG/DL (ref 70–100)
POTASSIUM SERPL-SCNC: 4.4 MMOL/L (ref 3.5–5.4)
PSA, ULTRASENSITIVE: <0.014 NG/ML (ref 0–4)
SODIUM BLD-SCNC: 143 MMOL/L (ref 135–148)
TESTOSTERONE TOTAL: 19 NG/DL (ref 193–740)

## 2025-04-08 ENCOUNTER — TELEPHONE (OUTPATIENT)
Dept: UROLOGY | Age: 75
End: 2025-04-08

## 2025-04-13 NOTE — PROGRESS NOTES
S1/S2.  Pulmonary/Chest: Effort normal. No respiratory distress. No wheezes, rhonchi, or rales.  Abdominal: Soft. Exhibits no distension or generalized tenderness. There is no rebound, rigidity, or guarding. No CVA tenderness.   Musculoskeletal: No pitting edema or calf tenderness.   Lymphadenopathy:   Right: No supraclavicular adenopathy present.   Left: No supraclavicular adenopathy present.   Neurological: Alert and oriented to person, place, and time. No cranial nerve deficit.   Skin: Skin is warm and dry. Not diaphoretic.   Psychiatric: Normal mood and affect. Behavior is normal.   Nursing note and vitals reviewed.    Labs    No results found for this visit on 04/14/25.    Lab Results   Component Value Date    CREATININE 1.64 (H) 03/31/2025    BUN 37 (H) 03/31/2025     03/31/2025    K 4.4 03/31/2025     03/31/2025    CO2 25 03/31/2025       Lab Results   Component Value Date    PSA  09/24/2024      Comment:      <0.014    PSA  01/27/2020      Comment:      <0.01    PSA  01/28/2019      Comment:      <0.014         Assessment/Plan:  1. Prostate cancer- Status post RALP with bilateral lymph node dissection in 6/12. His pathology was significant for [place] 4+5=9 disease with negative margins and negative nodes. Status post 6600cGy external beam radiation in August 2016. He started Firmagon in October 2017 and switched to Lupron 45 mg IM injections (January 2018) every six months for convenience. DEXA scan (1/18) revealed osteopenia. Started Prolia every six months. Last DEXA scan in 9/23 demonstrated osteopenia. He elected to stop Prolia at this time, however would like to restart. Will repeat DEXA in 9/25.  Lupron 45 mg IM injection today. He has been given the option to stop his Lupron injections since his PSA has been stable, however he would like to continue as he is afraid his prostate cancer will return. Mr. Sin will return in six months for status check and injection therapy. Call with any

## 2025-04-14 ENCOUNTER — OFFICE VISIT (OUTPATIENT)
Dept: UROLOGY | Age: 75
End: 2025-04-14
Payer: MEDICARE

## 2025-04-14 ENCOUNTER — CLINICAL SUPPORT (OUTPATIENT)
Dept: UROLOGY | Age: 75
End: 2025-04-14
Payer: MEDICARE

## 2025-04-14 VITALS — HEIGHT: 68 IN | WEIGHT: 165 LBS | RESPIRATION RATE: 18 BRPM | BODY MASS INDEX: 25.01 KG/M2

## 2025-04-14 DIAGNOSIS — M81.8 IDIOPATHIC OSTEOPOROSIS: ICD-10-CM

## 2025-04-14 DIAGNOSIS — R82.89 ABNORMAL URINE CYTOLOGY: ICD-10-CM

## 2025-04-14 DIAGNOSIS — C61 PROSTATE CANCER (HCC): Primary | ICD-10-CM

## 2025-04-14 DIAGNOSIS — R31.29 MICROSCOPIC HEMATURIA: ICD-10-CM

## 2025-04-14 PROCEDURE — 96372 THER/PROPH/DIAG INJ SC/IM: CPT

## 2025-04-14 PROCEDURE — 1159F MED LIST DOCD IN RCRD: CPT | Performed by: PHYSICIAN ASSISTANT

## 2025-04-14 PROCEDURE — 96402 CHEMO HORMON ANTINEOPL SQ/IM: CPT

## 2025-04-14 PROCEDURE — 4004F PT TOBACCO SCREEN RCVD TLK: CPT | Performed by: PHYSICIAN ASSISTANT

## 2025-04-14 PROCEDURE — 99213 OFFICE O/P EST LOW 20 MIN: CPT | Performed by: PHYSICIAN ASSISTANT

## 2025-04-14 PROCEDURE — G8427 DOCREV CUR MEDS BY ELIG CLIN: HCPCS | Performed by: PHYSICIAN ASSISTANT

## 2025-04-14 PROCEDURE — G8417 CALC BMI ABV UP PARAM F/U: HCPCS | Performed by: PHYSICIAN ASSISTANT

## 2025-04-14 PROCEDURE — 1123F ACP DISCUSS/DSCN MKR DOCD: CPT | Performed by: PHYSICIAN ASSISTANT

## 2025-04-14 NOTE — PROGRESS NOTES
Per patient medication list is the same as previous reviewed. Pt states taking BP med with water pill but does not know name.

## 2025-04-14 NOTE — PROGRESS NOTES
Patient has given me verbal consent to perform Lupron Injection  Yes    DIAGNOSIS/INDICATION:  prostate ca/idiopathic osteoporosis    Following Bhavik POWERS plan of care.  LUPRON 45 MG GIVEN I.M left UOQ HIP  Lot Number: [id number]  Expiration Date: 01/31/2027  [facility] #: 4958-0975-71    After Injection was given there were no reactions at injection site and patient was feeling well. Patient was notified that possible side effects from injections include: Redness, swelling and itching at the injection site. Possible side effects of androgen deprivation therapy, including hot flashes, flushing of the skin, increased weight, decreased sex drive, and difficulties with ED. Patient was instructed to call the office with any further questions or concerns.     Patient supplied their own medications No      Pt LHRH therapy (Firmagon, Eligard, Lupron, Orgovyx) first initiated on 10/16/2017.     Patient has given me verbal consent to perform Prolia Injection  Yes    DIAGNOSIS/INDICATION:  prostate ca/idiopathic osteoporosis    Patient's last Calcium level was 10.5 on the date of 03/31/2025.    Following Bhavik POWERS plan of care.  PROLIA 60MG GIVEN left S.C. in patient's arm  Lot Number: [id number]  Expiration Date: 07/31/2027  [facility] #: 72573-089-84    After Injection was given there were no reactions at injection site and patient was feeling well. Patient was notified that possible side effects from injections include: Redness, swelling and itching at the injection site. Possible side effects of androgen deprivation therapy, including hot flashes, flushing of the skin, increased weight, decreased sex drive, and difficulties with ED. Patient was instructed to inform their dental office that they are receiving Prolia and that major dental procedures should be avoided. Patient was advised to call our office with any further questions or concerns.     Any active dental problems, infections, or pain?  No    Date of last dental

## 2025-04-14 NOTE — PROGRESS NOTES
I have personally verified, reviewed, and approved of these actions as documented by Indu Anders CMA.

## 2025-05-05 ENCOUNTER — TELEPHONE (OUTPATIENT)
Dept: UROLOGY | Age: 75
End: 2025-05-05

## 2025-05-05 NOTE — TELEPHONE ENCOUNTER
Please let Mr. Sin know that his Bladder Cx testing is elevated at 0.18. This means that there is an abnormality detected in his urine that warrants further investigation to rule out a cancerous change in the bladder. I would recommend an office cystoscopy with Dr. Sullivan. If he believes this will be too uncomfortable, this can be done under sedation in the OR.

## 2025-05-05 NOTE — TELEPHONE ENCOUNTER
Patient was advised of the bladder cx test result. He wants to think about how he wants to proceed and will call the office back.

## (undated) DEVICE — INSUFFLATION NEEDLE TO ESTABLISH PNEUMOPERITONEUM.: Brand: INSUFFLATION NEEDLE

## (undated) DEVICE — BLADE LARYNSCP SZ 3 ENH DIR INTUB GLIDESCOPE MCGRATH MAC

## (undated) DEVICE — GENERAL LAPAROSCOPY PACK-LF: Brand: MEDLINE INDUSTRIES, INC.

## (undated) DEVICE — FLEXIBLE ADHESIVE BANDAGE: Brand: CURITY

## (undated) DEVICE — SOLUTION ANTIFOG VIS SYS CLEARIFY LAPSCP

## (undated) DEVICE — TUBING FLTR PLUME AWAY EVAC W/ SUCT DEV DISP PUREVIEW

## (undated) DEVICE — SYRINGE MED 30ML STD CLR PLAS LUERLOCK TIP N CTRL DISP

## (undated) DEVICE — ARM DRAPE

## (undated) DEVICE — GLOVE ORANGE PI 7 1/2   MSG9075

## (undated) DEVICE — SOLUTION IV 1000ML LAC RINGERS PH 6.5 INJ USP VIAFLX PLAS

## (undated) DEVICE — Device

## (undated) DEVICE — 3M™ TRANSPORE™ WHITE SURGICAL TAPE 1534-1, 1 INCH X 10 YARD (2,5CM X 9,1M), 12 ROLLS/CARTON 10 CARTONS/CASE: Brand: 3M™ TRANSPORE™

## (undated) DEVICE — MEDI-VAC YANKAUER SUCTION HANDLE W/BULBOUS TIP: Brand: CARDINAL HEALTH

## (undated) DEVICE — ELECTRO LUBE IS A SINGLE PATIENT USE DEVICE THAT IS INTENDED TO BE USED ON ELECTROSURGICAL ELECTRODES TO REDUCE STICKING.: Brand: KEY SURGICAL ELECTRO LUBE

## (undated) DEVICE — 3M™ WARMING BLANKET, UPPER BODY, 10 PER CASE, 42268: Brand: BAIR HUGGER™

## (undated) DEVICE — BLADELESS OBTURATOR: Brand: WECK VISTA

## (undated) DEVICE — INTENDED FOR TISSUE SEPARATION, AND OTHER PROCEDURES THAT REQUIRE A SHARP SURGICAL BLADE TO PUNCTURE OR CUT.: Brand: BARD-PARKER ® CARBON RIB-BACK BLADES

## (undated) DEVICE — [HIGH FLOW INSUFFLATOR,  DO NOT USE IF PACKAGE IS DAMAGED,  KEEP DRY,  KEEP AWAY FROM SUNLIGHT,  PROTECT FROM HEAT AND RADIOACTIVE SOURCES.]: Brand: PNEUMOSURE

## (undated) DEVICE — BASIC SINGLE BASIN 1-LF: Brand: MEDLINE INDUSTRIES, INC.

## (undated) DEVICE — COLUMN DRAPE

## (undated) DEVICE — GLOVE EXAM M L95IN FNGR THK35MIL PALM THK24MIL OFF WHT

## (undated) DEVICE — BINDER ABD SM MED 30 IN - 45 IN HT 12 IN

## (undated) DEVICE — GOWN,SIRUS,NONRNF,SETINSLV,XL,20/CS: Brand: MEDLINE

## (undated) DEVICE — GLOVE ORANGE PI 8 1/2   MSG9085

## (undated) DEVICE — MEDI-VAC NON-CONDUCTIVE SUCTION TUBING 6MM X 6.1M (20 FT.) L: Brand: CARDINAL HEALTH

## (undated) DEVICE — GOWN,SIRUS,NON REINFRCD,LARGE,SET IN SL: Brand: MEDLINE

## (undated) DEVICE — SUTURE VCRL SZ 0 L18IN ABSRB VLT SUTUPAK PRECUT W/O NDL J106T

## (undated) DEVICE — SEAL

## (undated) DEVICE — BLADE CLIPPER GEN PURP NS

## (undated) DEVICE — WIPE SANI AF3 GERM DISP 6X6.75

## (undated) DEVICE — GLOVE EXAM XL L95IN FNGR THK35MIL PALM THK24MIL OFF WHT

## (undated) DEVICE — TIP COVER ACCESSORY

## (undated) DEVICE — SOLUTION IV IRRIG WATER 1000ML POUR BRL 2F7114

## (undated) DEVICE — SUTURE V-LOC 180 SZ 3-0 L9IN ABSRB GRN L26MM V-20 1/2 CIR VLOCL0644

## (undated) DEVICE — REDUCER: Brand: ENDOWRIST

## (undated) DEVICE — CANNULA SEAL